# Patient Record
Sex: FEMALE | Race: WHITE | Employment: FULL TIME | URBAN - NONMETROPOLITAN AREA
[De-identification: names, ages, dates, MRNs, and addresses within clinical notes are randomized per-mention and may not be internally consistent; named-entity substitution may affect disease eponyms.]

---

## 2018-01-03 ENCOUNTER — OFFICE VISIT (OUTPATIENT)
Dept: FAMILY MEDICINE CLINIC | Age: 28
End: 2018-01-03

## 2018-01-03 VITALS
TEMPERATURE: 97.8 F | WEIGHT: 178.4 LBS | HEIGHT: 58 IN | DIASTOLIC BLOOD PRESSURE: 82 MMHG | OXYGEN SATURATION: 97 % | SYSTOLIC BLOOD PRESSURE: 130 MMHG | HEART RATE: 99 BPM | BODY MASS INDEX: 37.45 KG/M2

## 2018-01-03 DIAGNOSIS — Z13.31 POSITIVE DEPRESSION SCREENING: ICD-10-CM

## 2018-01-03 DIAGNOSIS — G47.00 INSOMNIA, UNSPECIFIED TYPE: Primary | ICD-10-CM

## 2018-01-03 DIAGNOSIS — J01.90 ACUTE SINUSITIS, RECURRENCE NOT SPECIFIED, UNSPECIFIED LOCATION: ICD-10-CM

## 2018-01-03 DIAGNOSIS — F41.9 ANXIETY: ICD-10-CM

## 2018-01-03 DIAGNOSIS — R40.0 DROWSINESS: ICD-10-CM

## 2018-01-03 DIAGNOSIS — F33.1 MODERATE EPISODE OF RECURRENT MAJOR DEPRESSIVE DISORDER (HCC): ICD-10-CM

## 2018-01-03 DIAGNOSIS — R06.83 SNORING: ICD-10-CM

## 2018-01-03 DIAGNOSIS — M54.50 MIDLINE LOW BACK PAIN WITHOUT SCIATICA, UNSPECIFIED CHRONICITY: ICD-10-CM

## 2018-01-03 PROCEDURE — G8427 DOCREV CUR MEDS BY ELIG CLIN: HCPCS | Performed by: NURSE PRACTITIONER

## 2018-01-03 PROCEDURE — 1036F TOBACCO NON-USER: CPT | Performed by: NURSE PRACTITIONER

## 2018-01-03 PROCEDURE — G8482 FLU IMMUNIZE ORDER/ADMIN: HCPCS | Performed by: NURSE PRACTITIONER

## 2018-01-03 PROCEDURE — G8417 CALC BMI ABV UP PARAM F/U: HCPCS | Performed by: NURSE PRACTITIONER

## 2018-01-03 PROCEDURE — 96160 PT-FOCUSED HLTH RISK ASSMT: CPT | Performed by: NURSE PRACTITIONER

## 2018-01-03 PROCEDURE — 99214 OFFICE O/P EST MOD 30 MIN: CPT | Performed by: NURSE PRACTITIONER

## 2018-01-03 PROCEDURE — G8431 POS CLIN DEPRES SCRN F/U DOC: HCPCS | Performed by: NURSE PRACTITIONER

## 2018-01-03 RX ORDER — CITALOPRAM 20 MG/1
20 TABLET ORAL DAILY
Qty: 30 TABLET | Refills: 5 | Status: CANCELLED | OUTPATIENT
Start: 2018-01-03

## 2018-01-03 RX ORDER — TRAZODONE HYDROCHLORIDE 100 MG/1
100 TABLET ORAL NIGHTLY
Qty: 30 TABLET | Refills: 3 | Status: SHIPPED | OUTPATIENT
Start: 2018-01-03 | End: 2018-04-23 | Stop reason: SDUPTHER

## 2018-01-03 RX ORDER — AMOXICILLIN AND CLAVULANATE POTASSIUM 875; 125 MG/1; MG/1
1 TABLET, FILM COATED ORAL 2 TIMES DAILY
Qty: 20 TABLET | Refills: 0 | Status: SHIPPED | OUTPATIENT
Start: 2018-01-03 | End: 2018-01-13

## 2018-01-03 RX ORDER — CYCLOBENZAPRINE HCL 10 MG
10 TABLET ORAL 3 TIMES DAILY PRN
Qty: 90 TABLET | Refills: 3 | Status: SHIPPED | OUTPATIENT
Start: 2018-01-03 | End: 2020-04-23 | Stop reason: SDUPTHER

## 2018-01-03 RX ORDER — ESCITALOPRAM OXALATE 10 MG/1
10 TABLET ORAL DAILY
Qty: 30 TABLET | Refills: 3 | Status: SHIPPED | OUTPATIENT
Start: 2018-01-03 | End: 2018-04-23 | Stop reason: SDUPTHER

## 2018-01-03 RX ORDER — MELOXICAM 15 MG/1
15 TABLET ORAL DAILY
Qty: 30 TABLET | Refills: 3 | Status: SHIPPED | OUTPATIENT
Start: 2018-01-03 | End: 2018-04-23 | Stop reason: SDUPTHER

## 2018-01-03 RX ORDER — DESOGESTREL AND ETHINYL ESTRADIOL 0.15-0.03
KIT ORAL
Refills: 12 | COMMUNITY
Start: 2017-12-22 | End: 2018-01-23 | Stop reason: SDUPTHER

## 2018-01-03 ASSESSMENT — ENCOUNTER SYMPTOMS
BACK PAIN: 1
SINUS PRESSURE: 1

## 2018-01-03 ASSESSMENT — PATIENT HEALTH QUESTIONNAIRE - PHQ9
SUM OF ALL RESPONSES TO PHQ9 QUESTIONS 1 & 2: 6
3. TROUBLE FALLING OR STAYING ASLEEP: 3
7. TROUBLE CONCENTRATING ON THINGS, SUCH AS READING THE NEWSPAPER OR WATCHING TELEVISION: 3
2. FEELING DOWN, DEPRESSED OR HOPELESS: 3
SUM OF ALL RESPONSES TO PHQ QUESTIONS 1-9: 25
9. THOUGHTS THAT YOU WOULD BE BETTER OFF DEAD, OR OF HURTING YOURSELF: 1
4. FEELING TIRED OR HAVING LITTLE ENERGY: 3
10. IF YOU CHECKED OFF ANY PROBLEMS, HOW DIFFICULT HAVE THESE PROBLEMS MADE IT FOR YOU TO DO YOUR WORK, TAKE CARE OF THINGS AT HOME, OR GET ALONG WITH OTHER PEOPLE: 2
8. MOVING OR SPEAKING SO SLOWLY THAT OTHER PEOPLE COULD HAVE NOTICED. OR THE OPPOSITE, BEING SO FIGETY OR RESTLESS THAT YOU HAVE BEEN MOVING AROUND A LOT MORE THAN USUAL: 3
6. FEELING BAD ABOUT YOURSELF - OR THAT YOU ARE A FAILURE OR HAVE LET YOURSELF OR YOUR FAMILY DOWN: 3
1. LITTLE INTEREST OR PLEASURE IN DOING THINGS: 3
5. POOR APPETITE OR OVEREATING: 3

## 2018-01-03 NOTE — PROGRESS NOTES
Subjective  Chief Complaint   Patient presents with    Anxiety     LOV Oct 2016, would like to talk about medications    Depression     screening done today, SCORED A 25. discuss meds    Back Pain     LOV Oct 2016    Medication Refill    Otalgia     pt states for the last 2 weeks dealing with ear pain. questioning infection? HPI     Pt is here for multiple issues    1) She does not feel that her celexa is working for her depression and anxiety any longer. 2) still struggling with back pain. mobic and flexeril do help. She has not gone to PT yet for it. 3) struggling with sinus pressure and some ear pain. Was dx with sinus infection and ear infection a few weeks ago. She did not take all her antibiotics and feels that it did not go away. 4) issues with sleep. States that she snores a lot and wakes up often. Has trouble falling asleep as well. Long family hx of sleep apnea. There are no active problems to display for this patient.     Past Medical History:   Diagnosis Date    Anxiety     Depression     Osteoarthritis      Past Surgical History:   Procedure Laterality Date    ARM SURGERY Left     fx at the age 4   [facility]  SECTION       Family History   Problem Relation Age of Onset    Depression Mother     High Cholesterol Maternal Grandfather     Heart Disease Maternal Grandfather     Cancer Maternal Grandfather      lung brain    Diabetes Maternal Grandfather     Diabetes Other      mothers dide    Depression Maternal Grandmother     Kidney Disease Paternal Grandfather      Social History     Social History    Marital status:      Spouse name: N/A    Number of children: N/A    Years of education: N/A     Social History Main Topics    Smoking status: Never Smoker    Smokeless tobacco: Never Used    Alcohol use No    Drug use: Unknown    Sexual activity: Yes     Partners: Male     Other Topics Concern    Not on file     Social History Narrative    No narrative on file     Current Outpatient Prescriptions on File Prior to Visit   Medication Sig Dispense Refill    ibuprofen (ADVIL;MOTRIN) 600 MG tablet Take 600 mg by mouth every 6 hours as needed for Pain       No current facility-administered medications on file prior to visit. Allergies   Allergen Reactions    Lidocaine Anaphylaxis    Bee Venom Swelling       Review of Systems   Constitutional: Positive for fatigue. HENT: Positive for congestion, ear pain and sinus pressure. Musculoskeletal: Positive for back pain. Neurological: Positive for headaches. Psychiatric/Behavioral: Positive for dysphoric mood and sleep disturbance. The patient is nervous/anxious. Objective  Vitals:    01/03/18 1107   BP: 130/82   Site: Left Arm   Position: Standing   Cuff Size: Medium Adult   Pulse: 99   Temp: 97.8 °F (36.6 °C)   TempSrc: Tympanic   SpO2: 97%   Weight: 178 lb 6.4 oz (80.9 kg)   Height: 4' 9.5\" (1.461 m)     Physical Exam   Constitutional: She is oriented to person, place, and time. She appears well-developed and well-nourished. HENT:   Right Ear: A middle ear effusion is present. Left Ear: A middle ear effusion is present. Nose: Right sinus exhibits maxillary sinus tenderness and frontal sinus tenderness. Left sinus exhibits maxillary sinus tenderness and frontal sinus tenderness. Eyes: Conjunctivae are normal. Pupils are equal, round, and reactive to light. Neck: Neck supple. No thyromegaly present. Cardiovascular: Normal rate, regular rhythm, normal heart sounds and intact distal pulses. Pulmonary/Chest: Effort normal and breath sounds normal.   Lymphadenopathy:     She has no cervical adenopathy. Neurological: She is alert and oriented to person, place, and time. Psychiatric:   Flat affect     Assessment & Plan    1. Insomnia, unspecified type  traZODone (DESYREL) 100 MG tablet   2.  Midline low back pain without sciatica, unspecified chronicity  meloxicam (MOBIC) 15 MG tablet    cyclobenzaprine (FLEXERIL) 10 MG tablet    [facility] [place]   3. Anxiety     4. Moderate episode of recurrent major depressive disorder (HCC)  escitalopram (LEXAPRO) 10 MG tablet   5. Snoring  Sleep Study with PAP Titration   6. Drowsiness  Sleep Study with PAP Titration   7. Acute sinusitis, recurrence not specified, unspecified location  amoxicillin-clavulanate (AUGMENTIN) 875-125 MG per tablet   8.  Positive depression screening  Positive Screen for Clinical Depression with a Documented Follow-up Plan        Orders Placed This Encounter   Procedures   [address]     Referral Priority:   Routine     Referral Type:   Eval and Treat     Referral Reason:   Specialty Services Required     Requested Specialty:   Physical Therapy     Number of Visits Requested:   1    Sleep Study with PAP Titration     Standing Status:   Future     Standing Expiration Date:   1/3/2019     Order Specific Question:   Sleep Study Titration Type     Answer:   Split Night Study (Baseline followed by PAP Titration)     Order Specific Question:   Location For Sleep Study     Answer:   [place]     Order Specific Question:   Select Sleep Lab Location     Answer:   [facility]    Positive Screen for Clinical Depression with a Documented Follow-up Plan        Orders Placed This Encounter   Medications    meloxicam (MOBIC) 15 MG tablet     Sig: Take 1 tablet by mouth daily     Dispense:  30 tablet     Refill:  3    cyclobenzaprine (FLEXERIL) 10 MG tablet     Sig: Take 1 tablet by mouth 3 times daily as needed for Muscle spasms     Dispense:  90 tablet     Refill:  3    escitalopram (LEXAPRO) 10 MG tablet     Sig: Take 1 tablet by mouth daily     Dispense:  30 tablet     Refill:  3    traZODone (DESYREL) 100 MG tablet     Sig: Take 1 tablet by mouth nightly     Dispense:  30 tablet     Refill:  3    amoxicillin-clavulanate (AUGMENTIN) 875-125 MG per tablet     Sig: Take

## 2018-01-11 ENCOUNTER — HOSPITAL ENCOUNTER (OUTPATIENT)
Dept: PHYSICAL THERAPY | Age: 28
Setting detail: THERAPIES SERIES
Discharge: HOME | End: 2018-01-11
Payer: COMMERCIAL

## 2018-01-11 PROCEDURE — 97162 PT EVAL MOD COMPLEX 30 MIN: CPT

## 2018-01-11 PROCEDURE — 97110 THERAPEUTIC EXERCISES: CPT

## 2018-01-11 ASSESSMENT — PAIN DESCRIPTION - FREQUENCY: FREQUENCY: CONTINUOUS

## 2018-01-11 ASSESSMENT — PAIN DESCRIPTION - DESCRIPTORS: DESCRIPTORS: ACHING

## 2018-01-11 ASSESSMENT — PAIN DESCRIPTION - LOCATION: LOCATION: BACK

## 2018-01-11 ASSESSMENT — PAIN DESCRIPTION - PAIN TYPE: TYPE: CHRONIC PAIN

## 2018-01-11 ASSESSMENT — PAIN SCALES - GENERAL: PAINLEVEL_OUTOF10: 2

## 2018-01-11 ASSESSMENT — PAIN DESCRIPTION - ORIENTATION: ORIENTATION: LOWER

## 2018-01-11 NOTE — PROGRESS NOTES
posture, dec B HS length; Pt reports dec function w/ bending, lifting/ carrying for housheold and job duties, poor kamar to prolonged standing needed for job. Specific instructions for Next Treatment: Cont w/ core stengthening, add stretches  Prognosis: Good      New Education Provided: HEP - see exs  G-Codes       PLAN: [x] Evaluate and Treat  Frequency/Duration:  Plan  Times per week: 2  Plan weeks: 5  Specific instructions for Next Treatment: Cont w/ core stengthening, add stretches  Current Treatment Recommendations: Strengthening, ROM, Neuromuscular Re-education, Manual Therapy - Soft Tissue Mobilization, Manual Therapy - Joint Manipulation, Modalities, Home Exercise Program, Integrated Dry Needling  Plan Comment: Skilled PT     Precautions:             ?     Patient Status:[x] Continue/ Initiate plan of Care    [] Discharge PT. Recommend pt continue with HEP. [] Additional visits requested, Please re-certify for additional visits:          Signature: Electronically signed by Elisha Arroyo PT on 1/11/18 at 10:09 AM      If you have any questions or concerns, please don't hesitate to call. Thank you for your referral.    I have reviewed this plan of care and certify a need for medically necessary rehabilitation services.     Physician Signature:__________________________________________________________  Date:  Please sign and return
strength, ROM, YANIV 18/50  Clinical Presentation: Mod- evolving        Plan  Frequency/Duration:  Plan  Times per week: 2  Plan weeks: 5  Specific instructions for Next Treatment: Cont w/ core stengthening, add stretches  Current Treatment Recommendations: Strengthening, ROM, Neuromuscular Re-education, Manual Therapy - Soft Tissue Mobilization, Manual Therapy - Joint Manipulation, Modalities, Home Exercise Program, Integrated Dry Needling  Plan Comment: Skilled PT       G-Codes       Patient Education  New Education Provided: HEP - see exs    POST-PAIN     Pain Rating (0-10 pain scale):1-2   /10  Location and pain description same as pre-treatment unless indicated. Action: [] NA  [] Call Physician  [x] Perform HEP  [] Meds as prescribed    Evaluation and patient rights have been reviewed and patient agrees with plan of care. Yes  [x]  No  []   Explain:       Sheldon Fall Risk Assessment  Risk Factor Scale  Score   History of Falls [] Yes  [] No 25  0 0   Secondary Diagnosis [] Yes  [] No 15  0 0   Ambulatory Aid [] Furniture  [] Crutches/cane/walker  [] None/bedrest/wheelchair/nurse 30  15  0 0   IV/Heparin Lock [] Yes  [] No 20  0 0   Gait/Transferring [] Impaired  [] Weak  [] Normal/bedrest/immobile 20  10  0 0   Mental Status [] Forgets limitations  [] Oriented to own ability 15  0 0      Total:0     Based on the Assessment score: check the appropriate box. [x]  No intervention needed   Low =   Score of 0-24  []  Use standard prevention interventions Moderate =  Score of 24-44   [] Discuss fall prevention strategies   [] Indicate moderate falls risk on eval  []  Use high risk prevention interventions High = Score of 45 and higher   [] Discuss fall prevention strategies   [] Provide supervision during treatment time    Goals  Long term goals  Time Frame for Long term goals : 10 visits   Long term goal 1:  Dec LBP and LE sxs to 0/10 to 4/10 at worse   Long term goal 2:  Inc strength of core to demo stability w/

## 2018-01-12 ENCOUNTER — HOSPITAL ENCOUNTER (OUTPATIENT)
Dept: PHYSICAL THERAPY | Age: 28
Setting detail: THERAPIES SERIES
Discharge: HOME | End: 2018-01-12
Payer: COMMERCIAL

## 2018-01-12 PROCEDURE — 97140 MANUAL THERAPY 1/> REGIONS: CPT

## 2018-01-12 PROCEDURE — 97110 THERAPEUTIC EXERCISES: CPT

## 2018-01-12 ASSESSMENT — PAIN DESCRIPTION - ORIENTATION: ORIENTATION: LOWER

## 2018-01-12 ASSESSMENT — PAIN SCALES - GENERAL: PAINLEVEL_OUTOF10: 5

## 2018-01-12 ASSESSMENT — PAIN DESCRIPTION - DESCRIPTORS: DESCRIPTORS: ACHING;SHARP

## 2018-01-12 ASSESSMENT — PAIN DESCRIPTION - LOCATION: LOCATION: BACK

## 2018-01-12 ASSESSMENT — PAIN DESCRIPTION - FREQUENCY: FREQUENCY: CONTINUOUS

## 2018-01-12 NOTE — PROGRESS NOTES
[address] [address]  Outpatient Physical Therapy    Treatment Note        Date: 2018  Patient: Anat Celis  : 1990  ACCT #: [de-identified]  Referring Practitioner: Robbi Arambula NP   Diagnosis: Midline LBP    Visit Information:  PT Visit Information  Onset Date: 18  Total # of Visits Approved: 30  Total # of Visits to Date: 2  No Show: 0  Canceled Appointment: 0  Progress Note Counter: 2/10     Subjective: \"My back hurts. \"     HEP Compliance:  [x] Good [] Fair [] Poor [] Reports not doing due to:    Vital Signs  Patient Currently in Pain: Yes   Pain Screening  Patient Currently in Pain: Yes  Pain Assessment  Pain Assessment: 0-10  Pain Level: 5  Pain Location: Back  Pain Orientation: Lower  Pain Descriptors: Aching; Sharp  Pain Frequency: Continuous    OBJECTIVE:   Exercises  Exercise 2: TA isos w/ breath 5\"x10  Exercise 3: TA marches x10  Exercise 4: TA alt ER x10  Exercise 5:  TA articulated iso bridges 10s x 10 ( w/ towel to dec HS cramping)  Exercise 6: B TA SLR x10  Exercise 7: SKTC 3x30\"  Exercise 8:  DKTC 3x30\"  Exercise 9: supine DLS alt UE/LE A31   Exercise 12: B HS stretch , seated w/ stool 3x30\"    Manual:   Manual therapy  Soft Tissue Mobalization: to Lx paraspinals with tennis ball to decrease muscle tension x8 min    Modalities:  Modalities  Moist heat: post tx to LB to decrease muscle tension x10 min     *Indicates exercise, modality, or manual techniques to be initiated when appropriate    Assessment: Body structures, Functions, Activity limitations: Decreased ADL status, Decreased high-level IADLs, Decreased ROM  Assessment: VC for hip flexion vs lumbar flexion to inc HS stretch. Good tolerance to all therex this date, though with VC for TA activation. Treatment Diagnosis: LBP: Impaired Strength  Prognosis: Good     Goals:  Long term goals  Time Frame for Long term goals : 10 visits   Long term goal 1:  Dec LBP and LE sxs to 0/10 to 4/10 at worse   Long term goal 2:  Inc

## 2018-01-15 ENCOUNTER — HOSPITAL ENCOUNTER (OUTPATIENT)
Dept: PHYSICAL THERAPY | Age: 28
Setting detail: THERAPIES SERIES
Discharge: HOME | End: 2018-01-15
Payer: COMMERCIAL

## 2018-01-15 PROCEDURE — 97110 THERAPEUTIC EXERCISES: CPT

## 2018-01-15 ASSESSMENT — PAIN SCALES - GENERAL: PAINLEVEL_OUTOF10: 4

## 2018-01-15 ASSESSMENT — PAIN DESCRIPTION - LOCATION: LOCATION: BACK

## 2018-01-15 ASSESSMENT — PAIN DESCRIPTION - ORIENTATION: ORIENTATION: LOWER

## 2018-01-15 ASSESSMENT — PAIN DESCRIPTION - DESCRIPTORS: DESCRIPTORS: ACHING;TIGHTNESS;SORE

## 2018-01-15 ASSESSMENT — PAIN DESCRIPTION - PAIN TYPE: TYPE: CHRONIC PAIN

## 2018-01-17 ENCOUNTER — HOSPITAL ENCOUNTER (OUTPATIENT)
Dept: PHYSICAL THERAPY | Age: 28
Setting detail: THERAPIES SERIES
Discharge: HOME | End: 2018-01-17
Payer: COMMERCIAL

## 2018-01-17 PROCEDURE — 97110 THERAPEUTIC EXERCISES: CPT

## 2018-01-17 NOTE — PROGRESS NOTES
[address] [address]  Outpatient Physical Therapy    Treatment Note        Date: 2018  Patient: Rosana Chacon  : 1990  ACCT #: [de-identified]  Referring Practitioner: Leila Cárdenas NP   Diagnosis: Midline LBP    Visit Information:  PT Visit Information  Onset Date: 18  Total # of Visits Approved: 30  Total # of Visits to Date: 4  No Show: 0  Canceled Appointment: 0  Progress Note Counter: 4/10    Subjective: Pt reports that pain comes and goes throughout the day  Comments: Pt arrived 8 min late for tx  HEP Compliance:  [x] Good [] Fair [] Poor [] Reports not doing due to:    Vital Signs  Patient Currently in Pain: No   Pain Screening  Patient Currently in Pain: No    OBJECTIVE:   Exercises  Exercise 1: piriformis stretch 3 x 20 sec in supine, b/l  Exercise 2: TA isos w/ breath 5\"x10  Exercise 4: TA alt ER x10  Exercise 5: bridges 5s x 15  Exercise 6: B TA SLR x15  Exercise 9: supine DLS x 15,, 3-way  Exercise 10: child's pose 3 x 30 sec  Exercise 11: ab walkouts x 15  Exercise 12:  HS stretch longsitting 3x30\" b/l  Exercise 13: DLS double arm reach x 15 with 2# ball  Exercise 16: rows/lats RTB x10  Exercise 20: HEP: supine DLS         Strength: [x] NT  [] MMT completed:     ROM: [x] NT  [] ROM measurements:         *Indicates exercise, modality, or manual techniques to be initiated when appropriate    Assessment:   Activity Tolerance  Activity Tolerance: Patient Tolerated treatment well    Body structures, Functions, Activity limitations: Decreased ADL status, Decreased high-level IADLs, Decreased ROM  Assessment: HS stretches performed longsitting with improved technique and no increase in pain. Additional core stability and posture strengthening ex added to improve function.   Treatment Diagnosis: LBP: Impaired Strength  Prognosis: Good       Goals:       Long term goals  Time Frame for Long term goals : 10 visits   Long term goal 1:  Dec LBP and LE sxs to 0/10 to 4/10 at worse   Long term

## 2018-01-22 ENCOUNTER — HOSPITAL ENCOUNTER (OUTPATIENT)
Dept: PHYSICAL THERAPY | Age: 28
Setting detail: THERAPIES SERIES
Discharge: HOME | End: 2018-01-22
Payer: COMMERCIAL

## 2018-01-22 PROCEDURE — 97110 THERAPEUTIC EXERCISES: CPT

## 2018-01-22 ASSESSMENT — PAIN DESCRIPTION - FREQUENCY: FREQUENCY: CONTINUOUS

## 2018-01-22 ASSESSMENT — PAIN SCALES - GENERAL: PAINLEVEL_OUTOF10: 3

## 2018-01-22 ASSESSMENT — PAIN DESCRIPTION - PAIN TYPE: TYPE: CHRONIC PAIN

## 2018-01-22 ASSESSMENT — PAIN DESCRIPTION - DESCRIPTORS: DESCRIPTORS: ACHING;SORE;TIGHTNESS

## 2018-01-22 NOTE — PROGRESS NOTES
Good      Goals:   Long term goals  Time Frame for Long term goals : 10 visits   Long term goal 1:  Dec LBP and LE sxs to 0/10 to 4/10 at worse   Long term goal 2: Inc strength of core to demo stability w/ lev 2 TA march ( from 90-90) to dec pain w/ lfiting/ carryin  Long term goal 3: Inc B HS length to demo PROM SLR >/= 55 to improve bending  Long term goal 4: Pt to demo improved seated psoture w/  minimal cuing  Long term goal 5: Pt to demo WFL- WNL Lx AROM pain-free  Long term goal 6: dec YANIV to </= 8/10  Progress toward goals: Cont to progress core/postural exs as able. POST-PAIN       Pain Rating (0-10 pain scale):  0 /10   Location and pain description same as pre-treatment unless indicated. Action: [] NA   [] Perform HEP  [] Meds as prescribed  [] Modalities as prescribed   [] Call Physician     Frequency/Duration:  Plan  Times per week: 2  Plan weeks: 5  Current Treatment Recommendations: Strengthening, ROM, Neuromuscular Re-education, Manual Therapy - Soft Tissue Mobilization, Manual Therapy - Joint Manipulation, Modalities, Home Exercise Program, Integrated Dry Needling     Pt to continue current HEP. See objective section for any therapeutic exercise changes, additions or modifications this date.     PT Individual Minutes  Time In: 5571  Time Out: 0405  Minutes: 38  Timed Code Treatment Minutes: 38 Minutes  Procedure Minutes: N/A  Signature:  Electronically signed by Kvng Steve PTA on 1/22/18 at 9:17 AM

## 2018-01-23 ENCOUNTER — OFFICE VISIT (OUTPATIENT)
Dept: OBGYN CLINIC | Age: 28
End: 2018-01-23
Payer: COMMERCIAL

## 2018-01-23 VITALS
SYSTOLIC BLOOD PRESSURE: 110 MMHG | WEIGHT: 179.4 LBS | BODY MASS INDEX: 37.66 KG/M2 | HEIGHT: 58 IN | DIASTOLIC BLOOD PRESSURE: 62 MMHG

## 2018-01-23 DIAGNOSIS — Z11.51 SCREENING FOR HPV (HUMAN PAPILLOMAVIRUS): ICD-10-CM

## 2018-01-23 DIAGNOSIS — Z11.3 ROUTINE SCREENING FOR STI (SEXUALLY TRANSMITTED INFECTION): ICD-10-CM

## 2018-01-23 DIAGNOSIS — Z01.419 WOMEN'S ANNUAL ROUTINE GYNECOLOGICAL EXAMINATION: ICD-10-CM

## 2018-01-23 DIAGNOSIS — Z01.419 WOMEN'S ANNUAL ROUTINE GYNECOLOGICAL EXAMINATION: Primary | ICD-10-CM

## 2018-01-23 LAB
HAV IGM SER IA-ACNC: NORMAL
HEPATITIS B CORE IGM ANTIBODY: NORMAL
HEPATITIS B SURFACE ANTIGEN INTERPRETATION: NORMAL
HEPATITIS C ANTIBODY INTERPRETATION: NORMAL
HEPATITIS INTERPRETATION:: NORMAL

## 2018-01-23 PROCEDURE — 99385 PREV VISIT NEW AGE 18-39: CPT | Performed by: OBSTETRICS & GYNECOLOGY

## 2018-01-23 ASSESSMENT — PATIENT HEALTH QUESTIONNAIRE - PHQ9
SUM OF ALL RESPONSES TO PHQ QUESTIONS 1-9: 0
SUM OF ALL RESPONSES TO PHQ9 QUESTIONS 1 & 2: 0
2. FEELING DOWN, DEPRESSED OR HOPELESS: 0
1. LITTLE INTEREST OR PLEASURE IN DOING THINGS: 0

## 2018-01-24 ENCOUNTER — HOSPITAL ENCOUNTER (OUTPATIENT)
Dept: PHYSICAL THERAPY | Age: 28
Setting detail: THERAPIES SERIES
Discharge: HOME | End: 2018-01-24
Payer: COMMERCIAL

## 2018-01-24 LAB
CLUE CELLS: NORMAL
RPR: NORMAL
TRICHOMONAS PREP: NORMAL
TRICHOMONAS VAGINALIS SCREEN: NEGATIVE
YEAST WET PREP: NORMAL

## 2018-01-24 PROCEDURE — 97110 THERAPEUTIC EXERCISES: CPT

## 2018-01-24 ASSESSMENT — PAIN DESCRIPTION - LOCATION: LOCATION: BACK

## 2018-01-24 ASSESSMENT — PAIN SCALES - GENERAL: PAINLEVEL_OUTOF10: 2

## 2018-01-24 ASSESSMENT — PAIN DESCRIPTION - PAIN TYPE: TYPE: CHRONIC PAIN

## 2018-01-24 ASSESSMENT — PAIN DESCRIPTION - ORIENTATION: ORIENTATION: LOWER

## 2018-01-24 ASSESSMENT — PAIN DESCRIPTION - DESCRIPTORS: DESCRIPTORS: ACHING;DULL;SHARP;TIGHTNESS

## 2018-01-25 LAB
HIV-1 AND HIV-2 ANTIBODIES: NEGATIVE
HSV 1 GLYCOPROTEIN G AB IGG: 18 IV
HSV 2 GLYCOPROTEIN G AB IGG: 4.51 IV

## 2018-01-29 LAB
C TRACH DNA GENITAL QL NAA+PROBE: NEGATIVE
N. GONORRHOEAE DNA: NEGATIVE

## 2018-01-29 RX ORDER — DESOGESTREL AND ETHINYL ESTRADIOL 0.15-0.03
KIT ORAL
Qty: 1 PACKET | Refills: 12 | Status: SHIPPED | OUTPATIENT
Start: 2018-01-29 | End: 2018-03-26 | Stop reason: SDUPTHER

## 2018-01-29 NOTE — PROGRESS NOTES
History and Physical  [facility] [place]   [address]  P: [phone] / F: [phone]  Gaby Poster  2018              33 y.o. Chief Complaint   Patient presents with    Annual Exam     last pap x5yrs.  Student     ok     New Patient       /62   Ht 4' 9.5\" (1.461 m)   Wt 179 lb 6.4 oz (81.4 kg)   LMP 2017   Breastfeeding? No   BMI 38.15 kg/m²   Alllergies:  Lidocaine and Bee venom               Primary Care Physician: Ronald Elizondo NP    HPI : Gaby Poster is a 33 y.o. female  The patient was seen and examined. She has no chief complaint today and is here for her annual exam.  Her bowels are regular. There are no voiding complaints. She denies any bloating. She denies vaginal discharge and was counseled on STD's and the need for barrier contraception.        ________________________________________________________________________  Obstetric History       T0      L1     SAB0   TAB0   Ectopic0   Molar0   Multiple0   Live Births1       # Outcome Date GA Lbr Daniel/2nd Weight Sex Delivery Anes PTL Lv   1  09   7 lb 13 oz (3.544 kg) F CS-Unspec EPI N RAO      Name: Kya Randall         Past Medical History:   Diagnosis Date    Anxiety     Depression     Osteoarthritis                                                                    Past Surgical History:   Procedure Laterality Date    ARM SURGERY Left     fx at the age 4   [place]  SECTION  2009     Family History   Problem Relation Age of Onset    Depression Mother     High Cholesterol Maternal Grandfather     Heart Disease Maternal Grandfather     Cancer Maternal Grandfather      lung brain    Diabetes Maternal Grandfather     Diabetes Other      mothers dide    Depression Maternal Grandmother     Kidney Disease Paternal Grandfather      Social History     Social History    Marital status:      Spouse name: ROS: No prior breast abnormalities or lumps  Respiratory ROS: No SOB, Pneumoniae,Cough, or Pulmonary Embolism History  Cardiovascular ROS: No Chest Pain with Exertion, Palpitations, Syncope, Edema, Arrhythmia  Gastrointestinal ROS: No Indigestion, Heartburn, Nausea, Vomiting, Diarrhea, Constipation,or Bowel Changes; No Bloody Stools or melena  Genito-Urinary ROS: No Dysuria, Hematuria or Nocturia. No Urinary Incontinence or Vaginal Discharge  Musculoskeletal ROS: No Arthralgia, Gout,Osteoporosis or Rheumatism  Neurological ROS: No CVA, Migraines, Epilepsy, Seizure Hx, or Limb Weakness  Dermatological ROS: No Rash, Itching, Hives, Mole Changes or Cancer                                                                                                                                                 PHYSICAL Exam:    Constitutional:  Vitals:    01/23/18 1241   BP: 110/62   Weight: 179 lb 6.4 oz (81.4 kg)   Height: 4' 9.5\" (1.461 m)       General Appearance: This  is a well Developed, well Nourished, well groomed female. Her BMI was reviewed. Nutritional decision making was discussed. Skin:  There was a Normal Inspection of the skin without rashes or lesions. There were no rashes. (Papular, Maculopapular, Hives, Pustular, Macular)     There were no lesions (Ulcers, Erythema, Abn. Appearing Nevi)      Lymphatic:  No Lymph Nodes were Palpable in the neck, axilla or groin. Neck and EENT:  The neck was supple. There were no masses   The thyroid was not enlarged and had no masses. Perrla, EOMI B/L, TMI B/L No Abnormalities. Throat inspected-No exudates or Masses, Nares Patent No Masses    Respiratory: The lungs were auscultated and found to be clear. There were no rales, rhonchi or wheezes. There was a good respiratory effort. Cardiovascular: The heart was in a regular rate and rhythm. No S3 or S4. There was no murmur appreciated. Location, grade, and radiation are not applicable. Extremities:   The patients extremities were without calf tenderness, edema, or varicosities. There was full range of motion in all four extremities. Pulses in all four extremities were appreciated and are 2/4. Abdomen: The abdomen was soft and non-tender. There were good bowel sounds in all quadrants and there was no guarding, rebound or rigidity. On evaluation there was no evidence of hepatosplenomegaly and there was no costal vertebral joshua tenderness bilaterally. No hernias were appreciated. Abdominal Scars:     Psych: The patient had a normal Orientation to: Time, Place, Person, and Situation  There is no Mood / Affect changes    Breast:  (Chest)  normal appearance, no masses or tenderness  Self breast exams were reviewed in detail. Literature was given. Pelvic Exam:  Vulva and vagina appear normal. Bimanual exam reveals normal uterus and adnexa. Rectal Exam:  Normal    Musculosk:  Normal Gait and station was noted. Digits were evaluated without abnormal findings. Range of motion, stability and strength were evaluated and found to be appropriate for the patients age. ASSESSMENT:      33 y.o. Annual  1. Women's annual routine gynecological examination  APRI 0.15-30 MG-MCG per tablet    PAP SMEAR    CANCELED: PAP SMEAR   2. Screening for HPV (human papillomavirus)  APRI 0.15-30 MG-MCG per tablet    PAP SMEAR    CANCELED: PAP SMEAR   3. Routine screening for STI (sexually transmitted infection)  Genital Culture    C.trachomatis N.gonorrhoeae DNA    Wet Prep, Genital    HIV-1,-2 w/Reflex to HIV-1 Western Blot    HSV 1, 2 Glyco G-Specific IgG    RPR Reflex to Titer and TPPA    Hepatitis Panel, Acute    CANCELED: PAP SMEAR                  Hereditary Breast, Ovarian, Colon and Uterine Cancer screening Done. Tobacco & Secondary smoke risks reviewed; instructed on cessation and avoidance      Counseling Completed:          PLAN:  No Follow-up on file.   Repeat Annual every 1 year  Cervical Cytology

## 2018-01-30 LAB
HPV COMMENT: ABNORMAL
HPV TYPE 16: NOT DETECTED
HPV TYPE 18: NOT DETECTED
HPVOH (OTHER TYPES): DETECTED

## 2018-02-15 ENCOUNTER — HOSPITAL ENCOUNTER (OUTPATIENT)
Dept: PHYSICAL THERAPY | Age: 28
Setting detail: THERAPIES SERIES
Discharge: HOME | End: 2018-02-15
Payer: COMMERCIAL

## 2018-02-15 PROCEDURE — 97110 THERAPEUTIC EXERCISES: CPT

## 2018-02-15 ASSESSMENT — PAIN DESCRIPTION - LOCATION: LOCATION: BACK

## 2018-02-15 ASSESSMENT — PAIN DESCRIPTION - ORIENTATION: ORIENTATION: LOWER

## 2018-02-15 ASSESSMENT — PAIN SCALES - GENERAL: PAINLEVEL_OUTOF10: 2

## 2018-02-15 ASSESSMENT — PAIN DESCRIPTION - FREQUENCY: FREQUENCY: CONTINUOUS

## 2018-02-15 ASSESSMENT — PAIN DESCRIPTION - PAIN TYPE: TYPE: CHRONIC PAIN

## 2018-02-16 ENCOUNTER — HOSPITAL ENCOUNTER (OUTPATIENT)
Dept: PHYSICAL THERAPY | Age: 28
Setting detail: THERAPIES SERIES
Discharge: HOME | End: 2018-02-16
Payer: COMMERCIAL

## 2018-02-16 PROCEDURE — 97110 THERAPEUTIC EXERCISES: CPT

## 2018-02-16 NOTE — PROGRESS NOTES
Treatment Diagnosis: LBP: Impaired Strength  Prognosis: Good  Goals:  Long term goals  Time Frame for Long term goals : 10 visits   Long term goal 1:  Dec LBP and LE sxs to 0/10 to 4/10 at worse   Long term goal 2: Inc strength of core to demo stability w/ lev 2 TA march ( from 90-90) to dec pain w/ lfiting/ carryin  Long term goal 3: Inc B HS length to demo PROM SLR >/= 55 to improve bending  Long term goal 4: Pt to demo improved seated psoture w/  minimal cuing  Long term goal 5: Pt to demo WFL- WNL Lx AROM pain-free  Long term goal 6: dec YANIV to </= 8/10  Progress toward goals: continue towards all     POST-PAIN       Pain Rating (0-10 pain scale):  0/10   Location and pain description same as pre-treatment unless indicated. Action: [] NA   [] Perform HEP  [] Meds as prescribed  [] Modalities as prescribed   [] Call Physician     Frequency/Duration:  Plan  Times per week: 2  Plan weeks: 5  Current Treatment Recommendations: Strengthening, ROM, Neuromuscular Re-education, Manual Therapy - Soft Tissue Mobilization, Manual Therapy - Joint Manipulation, Modalities, Home Exercise Program, Integrated Dry Needling     Pt to continue current HEP. See objective section for any therapeutic exercise changes, additions or modifications this date.          PT Individual Minutes  Time In: 0840  Time Out: 0920  Minutes: 40  Timed Code Treatment Minutes: 40 Minutes  Procedure Minutes:0  Signature:  Electronically signed by Neel Queen PTA on 2/16/18 at 10:25 AM

## 2018-03-20 ENCOUNTER — CLINICAL DOCUMENTATION (OUTPATIENT)
Dept: PHYSICAL THERAPY | Age: 28
End: 2018-03-20

## 2018-03-20 NOTE — PROGRESS NOTES
Glad[address]                                  Ph: [phone]  Fax: [phone]     [] Certification  [] Recertification  []  Plan of Care  [] Progress Note [x] Discharge                            To:  Referring Practitioner: Rajat Segura NP         From:  Rosita Neves, PT  Patient: Alondra Winchester     : 1990  Diagnosis: Midline LBP     Date: 2018  Treatment Diagnosis: LBP: Impaired Strength     Progress Report Period from:   2018 to 3-21-28     Total # of Visits to Date: 8               OBJECTIVE:      Long Term Goals - Time Frame for Long term goals : 10 visits   Goals Current/ Discharge status Met   Long term goal 1:  Dec LBP and LE sxs to 0/10 to 4/10 at worse  0-8/10 as of last visit 18 [] yes  [x] no   Long term goal 2: Inc strength of core to demo stability w/ lev 2 TA march ( from 90-90) to dec pain w/ lfiting/ carryin NT d/t unexpected D/C [] yes  [x] no   Long term goal 3: Inc B HS length to demo PROM SLR >/= 55 to improve bending PROM SLR R 90, L 80 ( 18) [] yes  [x] no   Long term goal 4: Pt to demo improved seated psoture w/  minimal cuing NT d/t unexpected D/C [] yes  [x] no   Long term goal 5: Pt to demo WFL- WNL Lx AROM pain-free NT d/t unexpected D/C [] yes  [x] no    Long term goal 6: dec YANIV to </= 8/10 NT d/t unexpected D/C [] yes  [x] no       [] yes  [] no             Assessment: Pt  Was still requiring cuinf for exs and demonstrated challenges w/ exs and reported dec N&T in LEs; Pt did not return to complete POC: D/C       PLAN: D/C             ? Patient Status:[x] Continue/ Initiate plan of Care                          [] Discharge PT. Recommend pt continue with HEP.                            [] Additional visits requested, Please re-certify for additional visits:

## 2018-03-26 ENCOUNTER — OFFICE VISIT (OUTPATIENT)
Dept: FAMILY MEDICINE CLINIC | Age: 28
End: 2018-03-26
Payer: COMMERCIAL

## 2018-03-26 VITALS
DIASTOLIC BLOOD PRESSURE: 72 MMHG | RESPIRATION RATE: 16 BRPM | HEART RATE: 90 BPM | SYSTOLIC BLOOD PRESSURE: 106 MMHG | OXYGEN SATURATION: 98 % | WEIGHT: 184.8 LBS | BODY MASS INDEX: 39.3 KG/M2

## 2018-03-26 DIAGNOSIS — N94.6 DYSMENORRHEA TREATED WITH ORAL CONTRACEPTIVE: ICD-10-CM

## 2018-03-26 DIAGNOSIS — Z79.3 DYSMENORRHEA TREATED WITH ORAL CONTRACEPTIVE: Primary | ICD-10-CM

## 2018-03-26 DIAGNOSIS — N94.6 DYSMENORRHEA TREATED WITH ORAL CONTRACEPTIVE: Primary | ICD-10-CM

## 2018-03-26 DIAGNOSIS — Z79.3 DYSMENORRHEA TREATED WITH ORAL CONTRACEPTIVE: ICD-10-CM

## 2018-03-26 LAB
BASOPHILS ABSOLUTE: 0 K/UL (ref 0–0.2)
BASOPHILS RELATIVE PERCENT: 0.3 %
EOSINOPHILS ABSOLUTE: 0.1 K/UL (ref 0–0.7)
EOSINOPHILS RELATIVE PERCENT: 1.1 %
FERRITIN: 17.8 NG/ML (ref 13–150)
HCT VFR BLD CALC: 38.3 % (ref 37–47)
HEMOGLOBIN: 12.8 G/DL (ref 12–16)
IRON SATURATION: 8 % (ref 11–46)
IRON: 35 UG/DL (ref 37–145)
LYMPHOCYTES ABSOLUTE: 1.9 K/UL (ref 1–4.8)
LYMPHOCYTES RELATIVE PERCENT: 29.4 %
MCH RBC QN AUTO: 29.5 PG (ref 27–31.3)
MCHC RBC AUTO-ENTMCNC: 33.6 % (ref 33–37)
MCV RBC AUTO: 87.9 FL (ref 82–100)
MONOCYTES ABSOLUTE: 0.3 K/UL (ref 0.2–0.8)
MONOCYTES RELATIVE PERCENT: 5 %
NEUTROPHILS ABSOLUTE: 4.1 K/UL (ref 1.4–6.5)
NEUTROPHILS RELATIVE PERCENT: 64.2 %
PDW BLD-RTO: 13.6 % (ref 11.5–14.5)
PLATELET # BLD: 214 K/UL (ref 130–400)
RBC # BLD: 4.35 M/UL (ref 4.2–5.4)
TOTAL IRON BINDING CAPACITY: 423 UG/DL (ref 178–450)
WBC # BLD: 6.4 K/UL (ref 4.8–10.8)

## 2018-03-26 PROCEDURE — G8482 FLU IMMUNIZE ORDER/ADMIN: HCPCS | Performed by: NURSE PRACTITIONER

## 2018-03-26 PROCEDURE — G8417 CALC BMI ABV UP PARAM F/U: HCPCS | Performed by: NURSE PRACTITIONER

## 2018-03-26 PROCEDURE — G8427 DOCREV CUR MEDS BY ELIG CLIN: HCPCS | Performed by: NURSE PRACTITIONER

## 2018-03-26 PROCEDURE — 99213 OFFICE O/P EST LOW 20 MIN: CPT | Performed by: NURSE PRACTITIONER

## 2018-03-26 PROCEDURE — 1036F TOBACCO NON-USER: CPT | Performed by: NURSE PRACTITIONER

## 2018-03-26 RX ORDER — DESOGESTREL AND ETHINYL ESTRADIOL 0.15-0.03
KIT ORAL
Qty: 2 PACKET | Refills: 0 | Status: SHIPPED | OUTPATIENT
Start: 2018-03-26 | End: 2018-10-08 | Stop reason: SDUPTHER

## 2018-03-26 RX ORDER — ONDANSETRON 4 MG/1
8 TABLET, ORALLY DISINTEGRATING ORAL EVERY 8 HOURS PRN
Qty: 40 TABLET | Refills: 0 | Status: SHIPPED | OUTPATIENT
Start: 2018-03-26 | End: 2018-04-05

## 2018-03-26 NOTE — PATIENT INSTRUCTIONS
Patient Education        Painful Menstrual Cramps: Care Instructions  Your Care Instructions    Painful menstrual cramps are very common. Many women go to the doctor because of bad cramps when they get their period. You may have cramps in your back, thighs, and belly. You may also have diarrhea, constipation, or nausea. Some women also get dizzy. Pain medicine and home treatment can help you feel better. Follow-up care is a key part of your treatment and safety. Be sure to make and go to all appointments, and call your doctor if you are having problems. It's also a good idea to know your test results and keep a list of the medicines you take. How can you care for yourself at home? · Take anti-inflammatory medicines for pain. Ibuprofen (Advil, Motrin) and naproxen (Aleve) usually work better than aspirin. ¨ Be safe with medicines. Talk to your doctor or pharmacist before you take any of these medicines. They may not be safe if you take other medicines or have other health problems. ¨ Start taking the recommended dose of pain medicine as soon as you start to feel pain. Or you can start on the day before your period. Keep taking the medicine for as many days as you have cramps. ¨ If anti-inflammatory medicines don't help, try acetaminophen (Tylenol). ¨ Do not take two or more pain medicines at the same time unless the doctor told you to. Many pain medicines have acetaminophen, which is Tylenol. Too much acetaminophen (Tylenol) can be harmful. ¨ Read and follow all instructions on the label. · Put a heating pad set on low or a hot water bottle on your belly. Or take a warm bath. Heat improves blood flow and may help with pain. · Lie down and put a pillow under your knees. Or lie on your side and bring your knees up to your chest. This will help with any back pressure. · Get at least 30 minutes of exercise on most days of the week. This improves blood flow and may decrease pain. Walking is a good choice.

## 2018-03-28 ASSESSMENT — ENCOUNTER SYMPTOMS
BACK PAIN: 0
WHEEZING: 0
NAUSEA: 0
DIARRHEA: 0
ABDOMINAL PAIN: 1
VOMITING: 0
SORE THROAT: 0
CONSTIPATION: 0
SHORTNESS OF BREATH: 0
COUGH: 0

## 2018-03-28 NOTE — PROGRESS NOTES
Subjective:      Patient ID: Romayne Olp is a 33 y.o. female who present today with:   Chief Complaint   Patient presents with    Dysmenorrhea     X3 wks, pt has had an exremely heavy for the last 3 wks, pt states she skipped the non-active tablets in her birth control packs for the last 4 months     Vaginal Bleeding   The patient's primary symptoms include vaginal bleeding. The patient's pertinent negatives include no genital itching, genital lesions, genital odor, genital rash, missed menses, pelvic pain or vaginal discharge. This is a new problem. The current episode started 1 to 4 weeks ago. The problem occurs constantly. The problem has been unchanged. The pain is moderate. The problem affects both sides. She is not pregnant. Associated symptoms include abdominal pain. Pertinent negatives include no anorexia, back pain, chills, constipation, diarrhea, discolored urine, dysuria, fever, flank pain, frequency, headaches, hematuria, joint pain, joint swelling, nausea, painful intercourse, rash, sore throat, urgency or vomiting. The vaginal discharge was bloody. The vaginal bleeding is heavier than menses. She has been passing clots. She has not been passing tissue. Nothing aggravates the symptoms. She has tried nothing for the symptoms. The treatment provided no relief. She is sexually active. No, her partner does not have an STD. She uses oral contraceptives for contraception. Her menstrual history has been regular. There is no history of an abdominal surgery, a  section, an ectopic pregnancy, endometriosis, a gynecological surgery, herpes simplex, menorrhagia, metrorrhagia, miscarriage, ovarian cysts, perineal abscess, PID, an STD, a terminated pregnancy or vaginosis.      Patient reports that she stopped taking the placebo pills in her OCP pack for the last 4 months  Has had her period for the last 3 weeks  Bleeding is heavy, clotting present    Past Medical History:   Diagnosis Date    Anxiety  Depression     Osteoarthritis      There are no active problems to display for this patient. Past Surgical History:   Procedure Laterality Date    ARM SURGERY Left     fx at the age 4   [facility]  SECTION       Social History     Social History    Marital status:      Spouse name: N/A    Number of children: N/A    Years of education: N/A     Social History Main Topics    Smoking status: Never Smoker    Smokeless tobacco: Never Used    Alcohol use No    Drug use: Unknown    Sexual activity: Yes     Partners: Male     Other Topics Concern    None     Social History Narrative    None     Current Outpatient Prescriptions on File Prior to Visit   Medication Sig Dispense Refill    meloxicam (MOBIC) 15 MG tablet Take 1 tablet by mouth daily 30 tablet 3    cyclobenzaprine (FLEXERIL) 10 MG tablet Take 1 tablet by mouth 3 times daily as needed for Muscle spasms 90 tablet 3    escitalopram (LEXAPRO) 10 MG tablet Take 1 tablet by mouth daily 30 tablet 3    traZODone (DESYREL) 100 MG tablet Take 1 tablet by mouth nightly 30 tablet 3    ibuprofen (ADVIL;MOTRIN) 600 MG tablet Take 600 mg by mouth every 6 hours as needed for Pain       No current facility-administered medications on file prior to visit. Allergies   Allergen Reactions    Lidocaine Anaphylaxis     Buvepcain (ingredient in epidural)    Bee Venom Swelling     Review of Systems   Constitutional: Negative for chills and fever. HENT: Negative for sore throat. Respiratory: Negative for cough, shortness of breath and wheezing. Cardiovascular: Negative for chest pain. Gastrointestinal: Positive for abdominal pain. Negative for anorexia, constipation, diarrhea, nausea and vomiting. Genitourinary: Positive for menstrual problem and vaginal bleeding. Negative for difficulty urinating, dysuria, flank pain, frequency, hematuria, menorrhagia, missed menses, pelvic pain, urgency and vaginal discharge.    Musculoskeletal: Negative

## 2018-04-23 DIAGNOSIS — M54.50 MIDLINE LOW BACK PAIN WITHOUT SCIATICA, UNSPECIFIED CHRONICITY: ICD-10-CM

## 2018-04-23 DIAGNOSIS — G47.00 INSOMNIA, UNSPECIFIED TYPE: ICD-10-CM

## 2018-04-23 DIAGNOSIS — F33.1 MODERATE EPISODE OF RECURRENT MAJOR DEPRESSIVE DISORDER (HCC): ICD-10-CM

## 2018-04-24 RX ORDER — TRAZODONE HYDROCHLORIDE 100 MG/1
100 TABLET ORAL NIGHTLY
Qty: 30 TABLET | Refills: 3 | Status: SHIPPED | OUTPATIENT
Start: 2018-04-24 | End: 2018-09-10 | Stop reason: SDUPTHER

## 2018-04-24 RX ORDER — ESCITALOPRAM OXALATE 10 MG/1
10 TABLET ORAL DAILY
Qty: 30 TABLET | Refills: 3 | Status: SHIPPED | OUTPATIENT
Start: 2018-04-24 | End: 2018-09-10 | Stop reason: SDUPTHER

## 2018-04-24 RX ORDER — MELOXICAM 15 MG/1
15 TABLET ORAL DAILY
Qty: 30 TABLET | Refills: 3 | Status: SHIPPED | OUTPATIENT
Start: 2018-04-24 | End: 2018-09-10 | Stop reason: SDUPTHER

## 2018-06-28 ENCOUNTER — HOSPITAL ENCOUNTER (OUTPATIENT)
Dept: SLEEP CENTER | Age: 28
Discharge: HOME | End: 2018-06-30
Payer: COMMERCIAL

## 2018-06-28 PROCEDURE — 95810 POLYSOM 6/> YRS 4/> PARAM: CPT

## 2018-07-12 ENCOUNTER — TELEPHONE (OUTPATIENT)
Dept: FAMILY MEDICINE CLINIC | Age: 28
End: 2018-07-12

## 2018-09-10 DIAGNOSIS — G47.00 INSOMNIA, UNSPECIFIED TYPE: ICD-10-CM

## 2018-09-10 DIAGNOSIS — M54.50 MIDLINE LOW BACK PAIN WITHOUT SCIATICA, UNSPECIFIED CHRONICITY: ICD-10-CM

## 2018-09-10 DIAGNOSIS — F33.1 MODERATE EPISODE OF RECURRENT MAJOR DEPRESSIVE DISORDER (HCC): ICD-10-CM

## 2018-09-10 RX ORDER — MELOXICAM 15 MG/1
15 TABLET ORAL DAILY
Qty: 30 TABLET | Refills: 3 | Status: SHIPPED | OUTPATIENT
Start: 2018-09-10 | End: 2019-01-24 | Stop reason: SDUPTHER

## 2018-09-10 RX ORDER — TRAZODONE HYDROCHLORIDE 100 MG/1
100 TABLET ORAL NIGHTLY
Qty: 30 TABLET | Refills: 3 | Status: SHIPPED | OUTPATIENT
Start: 2018-09-10 | End: 2019-10-15

## 2018-09-10 RX ORDER — ESCITALOPRAM OXALATE 10 MG/1
10 TABLET ORAL DAILY
Qty: 30 TABLET | Refills: 3 | Status: SHIPPED | OUTPATIENT
Start: 2018-09-10 | End: 2019-10-15

## 2018-10-08 DIAGNOSIS — N94.6 DYSMENORRHEA TREATED WITH ORAL CONTRACEPTIVE: ICD-10-CM

## 2018-10-08 DIAGNOSIS — Z79.3 DYSMENORRHEA TREATED WITH ORAL CONTRACEPTIVE: ICD-10-CM

## 2018-10-09 RX ORDER — DESOGESTREL AND ETHINYL ESTRADIOL 0.15-0.03
KIT ORAL
Qty: 56 TABLET | Refills: 0 | Status: SHIPPED | OUTPATIENT
Start: 2018-10-09 | End: 2018-12-03 | Stop reason: SDUPTHER

## 2018-12-03 DIAGNOSIS — N94.6 DYSMENORRHEA TREATED WITH ORAL CONTRACEPTIVE: ICD-10-CM

## 2018-12-03 DIAGNOSIS — Z79.3 DYSMENORRHEA TREATED WITH ORAL CONTRACEPTIVE: ICD-10-CM

## 2018-12-04 RX ORDER — DESOGESTREL AND ETHINYL ESTRADIOL 0.15-0.03
KIT ORAL
Qty: 56 TABLET | Refills: 0 | Status: SHIPPED | OUTPATIENT
Start: 2018-12-04 | End: 2020-03-19 | Stop reason: CLARIF

## 2019-01-24 DIAGNOSIS — M54.50 MIDLINE LOW BACK PAIN WITHOUT SCIATICA, UNSPECIFIED CHRONICITY: ICD-10-CM

## 2019-01-26 RX ORDER — MELOXICAM 15 MG/1
TABLET ORAL
Qty: 30 TABLET | Refills: 1 | Status: SHIPPED | OUTPATIENT
Start: 2019-01-26 | End: 2019-10-15

## 2019-10-15 ENCOUNTER — OFFICE VISIT (OUTPATIENT)
Dept: FAMILY MEDICINE CLINIC | Age: 29
End: 2019-10-15
Payer: COMMERCIAL

## 2019-10-15 VITALS
BODY MASS INDEX: 39.71 KG/M2 | TEMPERATURE: 97.7 F | WEIGHT: 189.2 LBS | HEART RATE: 90 BPM | HEIGHT: 58 IN | OXYGEN SATURATION: 100 % | DIASTOLIC BLOOD PRESSURE: 74 MMHG | SYSTOLIC BLOOD PRESSURE: 112 MMHG

## 2019-10-15 DIAGNOSIS — Z80.3 FAMILY HISTORY OF BREAST CANCER IN FIRST DEGREE RELATIVE: ICD-10-CM

## 2019-10-15 DIAGNOSIS — F33.1 MODERATE EPISODE OF RECURRENT MAJOR DEPRESSIVE DISORDER (HCC): Primary | ICD-10-CM

## 2019-10-15 DIAGNOSIS — Z85.3 HISTORY OF BREAST CANCER IN FEMALE: ICD-10-CM

## 2019-10-15 PROCEDURE — 99214 OFFICE O/P EST MOD 30 MIN: CPT | Performed by: NURSE PRACTITIONER

## 2019-10-15 RX ORDER — ESCITALOPRAM OXALATE 10 MG/1
10 TABLET ORAL DAILY
Qty: 30 TABLET | Refills: 3 | Status: SHIPPED | OUTPATIENT
Start: 2019-10-15 | End: 2020-02-29

## 2019-10-15 RX ORDER — DESOG-E.ESTRADIOL/E.ESTRADIOL 21-5 (28)
TABLET ORAL
Refills: 8 | COMMUNITY
Start: 2019-10-01 | End: 2021-01-26 | Stop reason: CLARIF

## 2019-10-15 ASSESSMENT — PATIENT HEALTH QUESTIONNAIRE - PHQ9
SUM OF ALL RESPONSES TO PHQ QUESTIONS 1-9: 20
1. LITTLE INTEREST OR PLEASURE IN DOING THINGS: 3
4. FEELING TIRED OR HAVING LITTLE ENERGY: 3
SUM OF ALL RESPONSES TO PHQ9 QUESTIONS 1 & 2: 6
SUM OF ALL RESPONSES TO PHQ QUESTIONS 1-9: 20
6. FEELING BAD ABOUT YOURSELF - OR THAT YOU ARE A FAILURE OR HAVE LET YOURSELF OR YOUR FAMILY DOWN: 3
5. POOR APPETITE OR OVEREATING: 3
2. FEELING DOWN, DEPRESSED OR HOPELESS: 3
3. TROUBLE FALLING OR STAYING ASLEEP: 3
8. MOVING OR SPEAKING SO SLOWLY THAT OTHER PEOPLE COULD HAVE NOTICED. OR THE OPPOSITE, BEING SO FIGETY OR RESTLESS THAT YOU HAVE BEEN MOVING AROUND A LOT MORE THAN USUAL: 1
9. THOUGHTS THAT YOU WOULD BE BETTER OFF DEAD, OR OF HURTING YOURSELF: 0
7. TROUBLE CONCENTRATING ON THINGS, SUCH AS READING THE NEWSPAPER OR WATCHING TELEVISION: 1
10. IF YOU CHECKED OFF ANY PROBLEMS, HOW DIFFICULT HAVE THESE PROBLEMS MADE IT FOR YOU TO DO YOUR WORK, TAKE CARE OF THINGS AT HOME, OR GET ALONG WITH OTHER PEOPLE: 3

## 2019-10-15 ASSESSMENT — ENCOUNTER SYMPTOMS
SHORTNESS OF BREATH: 0
COUGH: 0

## 2020-02-29 RX ORDER — ESCITALOPRAM OXALATE 10 MG/1
TABLET ORAL
Qty: 30 TABLET | Refills: 3 | Status: SHIPPED | OUTPATIENT
Start: 2020-02-29 | End: 2020-03-19

## 2020-03-19 ENCOUNTER — VIRTUAL VISIT (OUTPATIENT)
Dept: FAMILY MEDICINE CLINIC | Age: 30
End: 2020-03-19
Payer: COMMERCIAL

## 2020-03-19 PROCEDURE — 99442 PR PHYS/QHP TELEPHONE EVALUATION 11-20 MIN: CPT | Performed by: NURSE PRACTITIONER

## 2020-03-19 RX ORDER — ESCITALOPRAM OXALATE 20 MG/1
20 TABLET ORAL DAILY
Qty: 30 TABLET | Refills: 5 | Status: SHIPPED | OUTPATIENT
Start: 2020-03-19 | End: 2020-10-06

## 2020-03-19 ASSESSMENT — ENCOUNTER SYMPTOMS
SHORTNESS OF BREATH: 0
COUGH: 0

## 2020-03-19 NOTE — PROGRESS NOTES
Patient has been screened to determine that this visit qualifies for a \"Telephone Visit\". Patient is currently established with the current medical practice, the condition being reviewed was not addressed within the previous 7 days and is not likely be determined to need a procedure within the next 24 hours. The patient is aware and has given verbal consent to be billed for this telephone encounter. HPI:    Patient presents for fu of anxiety and depression. States that she is still struggling with symptoms now. Had improved initially with the lexapro 10 mg. Some chest pain recently. Having some shooting down left arm. Seems to last for some time. Happening almost daily. Elmer Adams had cardiac issues in 30's. Dad has hx of high BP. No fainting. Some dizziness and lightheadedness. Mild nausea. See ROS for other associated symptoms. PMH:    Current Outpatient Medications on File Prior to Visit   Medication Sig Dispense Refill    KARIVA 0.15-0.02/0.01 MG () per tablet TAKE 1 TABLET BY MOUTH EVERY DAY  8    cyclobenzaprine (FLEXERIL) 10 MG tablet Take 1 tablet by mouth 3 times daily as needed for Muscle spasms 90 tablet 3     No current facility-administered medications on file prior to visit.       Past Medical History:   Diagnosis Date    Anxiety     Depression     Osteoarthritis      Past Surgical History:   Procedure Laterality Date    ARM SURGERY Left     fx at the age 4   [employer]  SECTION       Social History     Socioeconomic History    Marital status:      Spouse name: Not on file    Number of children: Not on file    Years of education: Not on file    Highest education level: Not on file   Occupational History    Not on file   Social Needs    Financial resource strain: Not on file    Food insecurity     Worry: Not on file     Inability: Not on file    Transportation needs     Medical: Not on file     Non-medical: Not on file   Tobacco Use   

## 2020-04-01 ENCOUNTER — E-VISIT (OUTPATIENT)
Dept: FAMILY MEDICINE CLINIC | Age: 30
End: 2020-04-01
Payer: COMMERCIAL

## 2020-04-01 ENCOUNTER — TELEPHONE (OUTPATIENT)
Dept: FAMILY MEDICINE CLINIC | Age: 30
End: 2020-04-01

## 2020-04-01 PROCEDURE — 98970 NQHP OL DIG ASSMT&MGMT 5-10: CPT | Performed by: NURSE PRACTITIONER

## 2020-04-01 ASSESSMENT — LIFESTYLE VARIABLES: SMOKING_STATUS: NO, I HAVE NEVER SMOKED

## 2020-04-01 NOTE — TELEPHONE ENCOUNTER
Pt was sent home from they had her to a e visit with a  in [place]. He gave her a work note for 3 days and wanted her tested for Covid. Aware only those who end up in the hospital are tested. Can we do a work note to [employer] for 14 days?       [phone]

## 2020-04-23 ENCOUNTER — VIRTUAL VISIT (OUTPATIENT)
Dept: FAMILY MEDICINE CLINIC | Age: 30
End: 2020-04-23
Payer: COMMERCIAL

## 2020-04-23 PROCEDURE — 99213 OFFICE O/P EST LOW 20 MIN: CPT | Performed by: NURSE PRACTITIONER

## 2020-04-23 RX ORDER — BUSPIRONE HYDROCHLORIDE 5 MG/1
5 TABLET ORAL 3 TIMES DAILY
Qty: 90 TABLET | Refills: 1 | Status: SHIPPED | OUTPATIENT
Start: 2020-04-23 | End: 2020-05-21

## 2020-04-23 RX ORDER — NAPROXEN 500 MG/1
500 TABLET ORAL 2 TIMES DAILY WITH MEALS
Qty: 60 TABLET | Refills: 5 | OUTPATIENT
Start: 2020-04-23 | End: 2021-05-19

## 2020-04-23 RX ORDER — CYCLOBENZAPRINE HCL 10 MG
10 TABLET ORAL 3 TIMES DAILY PRN
Qty: 90 TABLET | Refills: 3 | Status: SHIPPED | OUTPATIENT
Start: 2020-04-23 | End: 2021-05-19 | Stop reason: ALTCHOICE

## 2020-04-23 ASSESSMENT — ENCOUNTER SYMPTOMS: ABDOMINAL PAIN: 1

## 2020-04-23 NOTE — PROGRESS NOTES
2020    TELEHEALTH EVALUATION -- Audio/Visual (During SHLCN-43 public health emergency)    Due to Michelle 19 outbreak, patient's office visit was converted to a virtual visit. Patient was contacted and agreed to proceed with a virtual visit via [email or web address]. me  The risks and benefits of converting to a virtual visit were discussed in light of the current infectious disease epidemic. Patient also understood that insurance coverage and co-pays are up to their individual insurance plans. HPI:    Yovanny Medellin (:  1990) has requested an audio/video evaluation for the following concern(s):    Fu for anxiety and depression. Increased lexapro last visit. Still having a lot of panic attacks. Would like to try something else to help. Also having very bad cramps with her menses. Would like refill of flexeril for her back. Review of Systems   Constitutional: Negative for fatigue. Gastrointestinal: Positive for abdominal pain. Psychiatric/Behavioral: Positive for dysphoric mood. The patient is nervous/anxious. Prior to Visit Medications    Medication Sig Taking?  Authorizing Provider   busPIRone (BUSPAR) 5 MG tablet Take 1 tablet by mouth 3 times daily Yes SHAMIKA Valladares CNP   cyclobenzaprine (FLEXERIL) 10 MG tablet Take 1 tablet by mouth 3 times daily as needed for Muscle spasms Yes SHAMIKA Valladares CNP   naproxen (NAPROSYN) 500 MG tablet Take 1 tablet by mouth 2 times daily (with meals) Yes SHAMIKA Valladares CNP   escitalopram (LEXAPRO) 20 MG tablet Take 1 tablet by mouth daily Yes SHAMIKA Valladares CNP   Marcela Brisk 0.15-0.02/0.01 MG (/5) per tablet TAKE 1 TABLET BY MOUTH EVERY DAY Yes Historical Provider, MD       Social History     Tobacco Use    Smoking status: Never Smoker    Smokeless tobacco: Never Used   Substance Use Topics    Alcohol use: No    Drug use: Not on file        Allergies   Allergen Reactions    Lidocaine Anaphylaxis     Buvepcain tablet by mouth 3 times daily as needed for Muscle spasms  Dispense: 90 tablet; Refill: 3    3. Dysmenorrhea    - naproxen (NAPROSYN) 500 MG tablet; Take 1 tablet by mouth 2 times daily (with meals)  Dispense: 60 tablet; Refill: 5    Side effects, adverse effects of the medication prescribed today, as well as treatment plan/ rationale and result expectations have been discussed with the patient who expresses understanding and desires to proceed. Close follow up to evaluate treatment results and for coordination of care. I have reviewed the patient's medical history in detail and updated the computerized patient record. As always, patient is advised that if symptoms worsen in any way they will proceed to the nearest emergency room. Return in about 4 weeks (around 5/21/2020). An  electronic signature was used to authenticate this note. --SHAMIKA Humphrey - CNP on 4/23/2020 at 1:15 PM        Pursuant to the emergency declaration under the [facility] waiver authority and the [employer] and [employer] Act, this Virtual  Visit was conducted, with patient's consent, to reduce the patient's risk of exposure to COVID-19 and provide continuity of care for an established patient. Services were provided through a video synchronous discussion virtually to substitute for in-person clinic visit.

## 2020-05-21 ENCOUNTER — VIRTUAL VISIT (OUTPATIENT)
Dept: FAMILY MEDICINE CLINIC | Age: 30
End: 2020-05-21
Payer: COMMERCIAL

## 2020-05-21 PROCEDURE — 99213 OFFICE O/P EST LOW 20 MIN: CPT | Performed by: NURSE PRACTITIONER

## 2020-05-21 RX ORDER — BUSPIRONE HYDROCHLORIDE 10 MG/1
10 TABLET ORAL 3 TIMES DAILY
Qty: 90 TABLET | Refills: 2 | Status: SHIPPED | OUTPATIENT
Start: 2020-05-21 | End: 2020-08-19

## 2020-05-21 ASSESSMENT — ENCOUNTER SYMPTOMS
COUGH: 0
SHORTNESS OF BREATH: 0

## 2020-05-21 NOTE — PROGRESS NOTES
coordination of care. I have reviewed the patient's medical history in detail and updated the computerized patient record. As always, patient is advised that if symptoms worsen in any way they will proceed to the nearest emergency room. Return in about 4 weeks (around 6/18/2020). An  electronic signature was used to authenticate this note. --SHAMIKA Combs - CNP on 5/21/2020 at 11:30 AM        Pursuant to the emergency declaration under the [address] [place] [address], [facility] waiver authority and the [employer] and [employer] Act, this Virtual  Visit was conducted, with patient's consent, to reduce the patient's risk of exposure to COVID-19 and provide continuity of care for an established patient. Services were provided through a video synchronous discussion virtually to substitute for in-person clinic visit.

## 2020-06-18 ENCOUNTER — VIRTUAL VISIT (OUTPATIENT)
Dept: FAMILY MEDICINE CLINIC | Age: 30
End: 2020-06-18
Payer: COMMERCIAL

## 2020-06-18 ENCOUNTER — TELEPHONE (OUTPATIENT)
Dept: FAMILY MEDICINE CLINIC | Age: 30
End: 2020-06-18

## 2020-06-18 PROCEDURE — 99212 OFFICE O/P EST SF 10 MIN: CPT | Performed by: NURSE PRACTITIONER

## 2020-06-18 ASSESSMENT — ENCOUNTER SYMPTOMS
COUGH: 0
SHORTNESS OF BREATH: 0

## 2020-06-18 NOTE — TELEPHONE ENCOUNTER
Check out process for appt  Called patient, no answer, lvm    Return in about 4 weeks (around 7/16/2020).

## 2020-06-18 NOTE — PROGRESS NOTES
2020    TELEHEALTH EVALUATION -- Audio/Visual (During UBXYZ-57 public health emergency)    Due to Michelle 19 outbreak, patient's office visit was converted to a virtual visit. Patient was contacted and agreed to proceed with a virtual visit via [employer]. me  The risks and benefits of converting to a virtual visit were discussed in light of the current infectious disease epidemic. Patient also understood that insurance coverage and co-pays are up to their individual insurance plans. HPI:    Juan Stokes (:  1990) has requested an audio/video evaluation for the following concern(s):    Doing better with anxiety and depression. Taking lexapro and buspar and tolerating it well. Questioning fibro? States that sometimes that she feels like her skin is on fire. Review of Systems   Constitutional: Negative for fatigue. Respiratory: Negative for cough and shortness of breath. Cardiovascular: Negative for chest pain. Psychiatric/Behavioral: Negative for dysphoric mood. The patient is not nervous/anxious. Prior to Visit Medications    Medication Sig Taking?  Authorizing Provider   busPIRone (BUSPAR) 10 MG tablet Take 1 tablet by mouth 3 times daily Yes SHAMIKA Kidd CNP   cyclobenzaprine (FLEXERIL) 10 MG tablet Take 1 tablet by mouth 3 times daily as needed for Muscle spasms Yes SHAMIKA Kidd CNP   naproxen (NAPROSYN) 500 MG tablet Take 1 tablet by mouth 2 times daily (with meals) Yes SHAMIKA Kidd CNP   escitalopram (LEXAPRO) 20 MG tablet Take 1 tablet by mouth daily Yes SHAMIKA Kidd CNP   [place] 0.15-0.02/0.01 MG (/5) per tablet TAKE 1 TABLET BY MOUTH EVERY DAY Yes Historical Provider, MD       Social History     Tobacco Use    Smoking status: Never Smoker    Smokeless tobacco: Never Used   Substance Use Topics    Alcohol use: No    Drug use: Not on file        Allergies   Allergen Reactions    Lidocaine Anaphylaxis     Buvepcain (ingredient in epidural)    Bee Venom Swelling   ,   Past Medical History:   Diagnosis Date    Anxiety     Depression     Osteoarthritis    ,   Past Surgical History:   Procedure Laterality Date    ARM SURGERY Left     fx at the age 4     SECTION     ,   Social History     Tobacco Use    Smoking status: Never Smoker    Smokeless tobacco: Never Used   Substance Use Topics    Alcohol use: No    Drug use: Not on file   ,   Family History   Problem Relation Age of Onset    Depression Mother     High Cholesterol Maternal Grandfather     Heart Disease Maternal Grandfather     Cancer Maternal Grandfather         lung brain    Diabetes Maternal Grandfather     Diabetes Other         mothers dide    Depression Maternal Grandmother     Kidney Disease Paternal Grandfather        PHYSICAL EXAMINATION:  [ INSTRUCTIONS:  \"[x]\" Indicates a positive item  \"[]\" Indicates a negative item  -- DELETE ALL ITEMS NOT EXAMINED]  [x] Alert  [x] Oriented to person/place/time    [x] No apparent distress  [] Toxic appearing    [] Face flushed appearing [] Sclera clear  [] Lips are cyanotic      [x] Breathing appears normal  [] Appears tachypneic      [] Rash on visible skin    [] Cranial Nerves II-XII grossly intact    [] Motor grossly intact in visible upper extremities    [] Motor grossly intact in visible lower extremities    [x] Normal Mood  [] Anxious appearing    [] Depressed appearing  [] Confused appearing      [] Poor short term memory  [] Poor long term memory    [] OTHER:      Due to this being a TeleHealth encounter, evaluation of the following organ systems is limited: Vitals/Constitutional/EENT/Resp/CV/GI//MS/Neuro/Skin/Heme-Lymph-Imm. ASSESSMENT/PLAN:  1. Anxiety  - continue with current meds.   - schedule appt to r/o fibro per pt request.    Side effects, adverse effects of the medication prescribed today, as well as treatment plan/ rationale and result expectations have been discussed with the patient who

## 2020-08-19 RX ORDER — BUSPIRONE HYDROCHLORIDE 10 MG/1
TABLET ORAL
Qty: 90 TABLET | Refills: 0 | Status: SHIPPED | OUTPATIENT
Start: 2020-08-19 | End: 2021-01-26 | Stop reason: SDUPTHER

## 2020-10-06 RX ORDER — ESCITALOPRAM OXALATE 20 MG/1
TABLET ORAL
Qty: 30 TABLET | Refills: 0 | Status: SHIPPED | OUTPATIENT
Start: 2020-10-06 | End: 2021-01-26 | Stop reason: SDUPTHER

## 2021-01-26 ENCOUNTER — VIRTUAL VISIT (OUTPATIENT)
Dept: FAMILY MEDICINE CLINIC | Age: 31
End: 2021-01-26
Payer: COMMERCIAL

## 2021-01-26 DIAGNOSIS — F41.9 ANXIETY AND DEPRESSION: ICD-10-CM

## 2021-01-26 DIAGNOSIS — G43.909 MIGRAINE WITHOUT STATUS MIGRAINOSUS, NOT INTRACTABLE, UNSPECIFIED MIGRAINE TYPE: Primary | ICD-10-CM

## 2021-01-26 DIAGNOSIS — F41.9 ANXIETY: ICD-10-CM

## 2021-01-26 DIAGNOSIS — F32.A ANXIETY AND DEPRESSION: ICD-10-CM

## 2021-01-26 PROCEDURE — 99213 OFFICE O/P EST LOW 20 MIN: CPT | Performed by: NURSE PRACTITIONER

## 2021-01-26 RX ORDER — ESCITALOPRAM OXALATE 20 MG/1
TABLET ORAL
Qty: 30 TABLET | Refills: 5 | Status: ON HOLD | OUTPATIENT
Start: 2021-01-26 | End: 2021-11-25 | Stop reason: HOSPADM

## 2021-01-26 RX ORDER — BUSPIRONE HYDROCHLORIDE 10 MG/1
TABLET ORAL
Qty: 90 TABLET | Refills: 5 | Status: SHIPPED | OUTPATIENT
Start: 2021-01-26 | End: 2021-05-19 | Stop reason: ALTCHOICE

## 2021-01-26 RX ORDER — SUMATRIPTAN 100 MG/1
100 TABLET, FILM COATED ORAL
Qty: 9 TABLET | Refills: 5 | Status: ON HOLD | OUTPATIENT
Start: 2021-01-26 | End: 2021-11-25 | Stop reason: HOSPADM

## 2021-01-26 RX ORDER — PROPRANOLOL HCL 60 MG
60 CAPSULE, EXTENDED RELEASE 24HR ORAL DAILY
Qty: 30 CAPSULE | Refills: 3 | OUTPATIENT
Start: 2021-01-26 | End: 2021-05-19

## 2021-01-26 SDOH — ECONOMIC STABILITY: TRANSPORTATION INSECURITY
IN THE PAST 12 MONTHS, HAS THE LACK OF TRANSPORTATION KEPT YOU FROM MEDICAL APPOINTMENTS OR FROM GETTING MEDICATIONS?: NO

## 2021-01-26 SDOH — ECONOMIC STABILITY: FOOD INSECURITY: WITHIN THE PAST 12 MONTHS, THE FOOD YOU BOUGHT JUST DIDN'T LAST AND YOU DIDN'T HAVE MONEY TO GET MORE.: SOMETIMES TRUE

## 2021-01-26 SDOH — ECONOMIC STABILITY: INCOME INSECURITY: HOW HARD IS IT FOR YOU TO PAY FOR THE VERY BASICS LIKE FOOD, HOUSING, MEDICAL CARE, AND HEATING?: SOMEWHAT HARD

## 2021-01-26 SDOH — ECONOMIC STABILITY: TRANSPORTATION INSECURITY
IN THE PAST 12 MONTHS, HAS LACK OF TRANSPORTATION KEPT YOU FROM MEETINGS, WORK, OR FROM GETTING THINGS NEEDED FOR DAILY LIVING?: NO

## 2021-01-26 ASSESSMENT — PATIENT HEALTH QUESTIONNAIRE - PHQ9
SUM OF ALL RESPONSES TO PHQ QUESTIONS 1-9: 1
SUM OF ALL RESPONSES TO PHQ QUESTIONS 1-9: 1

## 2021-01-26 ASSESSMENT — ENCOUNTER SYMPTOMS
NAUSEA: 1
COUGH: 0

## 2021-01-26 NOTE — PROGRESS NOTES
2021    TELEHEALTH EVALUATION -- Audio/Visual (During KCIEN-67 public health emergency)    Due to Michelle 19 outbreak, patient's office visit was converted to a virtual visit. Patient was contacted and agreed to proceed with a virtual visit via [employer]. me  The risks and benefits of converting to a virtual visit were discussed in light of the current infectious disease epidemic. Patient also understood that insurance coverage and co-pays are up to their individual insurance plans. HPI:    Jacquelyn Garcia (:  1990) has requested an audio/video evaluation for the following concern(s):    Migraines have been persistent issues x past few mos. Taking excedrin which doesn't always help  Pain is usually located in forehead and back of head. Gets nauseous with HA's   Has always had migraines. No neuro symptoms    Dark room helps. Would like to try medication to help prevent. Review of Systems   Constitutional: Negative for fatigue. Respiratory: Negative for cough. Cardiovascular: Negative for chest pain. Gastrointestinal: Positive for nausea. Neurological: Positive for headaches. Prior to Visit Medications    Medication Sig Taking?  Authorizing Provider   escitalopram (LEXAPRO) 20 MG tablet TAKE 1 TABLET BY MOUTH EVERY DAY Yes SHAMIKA Whitney CNP   busPIRone (BUSPAR) 10 MG tablet TAKE 1 TABLET BY MOUTH THREE TIMES A DAY Yes SHAMIKA Whitney CNP   SUMAtriptan (IMITREX) 100 MG tablet Take 1 tablet by mouth once as needed for Migraine Yes SHAMIKA Whitney CNP   propranolol (INDERAL LA) 60 MG extended release capsule Take 1 capsule by mouth daily Yes SHAMIKA Whitney CNP   cyclobenzaprine (FLEXERIL) 10 MG tablet Take 1 tablet by mouth 3 times daily as needed for Muscle spasms Yes SHAMIKA Whitney CNP   naproxen (NAPROSYN) 500 MG tablet Take 1 tablet by mouth 2 times daily (with meals) Yes SHAMIKA Whitney CNP       Social History     Tobacco Use    Smoking status: Never Smoker    Smokeless tobacco: Never Used   Substance Use Topics    Alcohol use: No    Drug use: Not on file        Allergies   Allergen Reactions    Lidocaine Anaphylaxis     Buvepcain (ingredient in epidural)    Bee Venom Swelling   ,   Past Medical History:   Diagnosis Date    Anxiety     Depression     Osteoarthritis    ,   Past Surgical History:   Procedure Laterality Date    ARM SURGERY Left     fx at the age 4     SECTION  2009   ,   Social History     Tobacco Use    Smoking status: Never Smoker    Smokeless tobacco: Never Used   Substance Use Topics    Alcohol use: No    Drug use: Not on file   ,   Family History   Problem Relation Age of Onset    Depression Mother     High Cholesterol Maternal Grandfather     Heart Disease Maternal Grandfather     Cancer Maternal Grandfather         lung brain    Diabetes Maternal Grandfather     Diabetes Other         mothers dide    Depression Maternal Grandmother     Kidney Disease Paternal Grandfather        PHYSICAL EXAMINATION:  [ INSTRUCTIONS:  \"[x]\" Indicates a positive item  \"[]\" Indicates a negative item  -- DELETE ALL ITEMS NOT EXAMINED]  [x] Alert  [x] Oriented to person/place/time    [x] No apparent distress  [] Toxic appearing    [] Face flushed appearing [] Sclera clear  [] Lips are cyanotic      [x] Breathing appears normal  [] Appears tachypneic      [] Rash on visible skin    [] Cranial Nerves II-XII grossly intact    [] Motor grossly intact in visible upper extremities    [] Motor grossly intact in visible lower extremities    [x] Normal Mood  [] Anxious appearing    [] Depressed appearing  [] Confused appearing      [] Poor short term memory  [] Poor long term memory    [] OTHER:      Due to this being a TeleHealth encounter, evaluation of the following organ systems is limited: Vitals/Constitutional/EENT/Resp/CV/GI//MS/Neuro/Skin/Heme-Lymph-Imm. ASSESSMENT/PLAN:  1.  Anxiety and depression    - escitalopram (LEXAPRO) 20 MG tablet; TAKE 1 TABLET BY MOUTH EVERY DAY  Dispense: 30 tablet; Refill: 5    2. Anxiety    - busPIRone (BUSPAR) 10 MG tablet; TAKE 1 TABLET BY MOUTH THREE TIMES A DAY  Dispense: 90 tablet; Refill: 5    3. Migraine without status migrainosus, not intractable, unspecified migraine type    - SUMAtriptan (IMITREX) 100 MG tablet; Take 1 tablet by mouth once as needed for Migraine  Dispense: 9 tablet; Refill: 5  - propranolol (INDERAL LA) 60 MG extended release capsule; Take 1 capsule by mouth daily  Dispense: 30 capsule; Refill: 3      Side effects, adverse effects of the medication prescribed today, as well as treatment plan/ rationale and result expectations have been discussed with the patient who expresses understanding and desires to proceed. Close follow up to evaluate treatment results and for coordination of care. I have reviewed the patient's medical history in detail and updated the computerized patient record. As always, patient is advised that if symptoms worsen in any way they will proceed to the nearest emergency room. Return in about 4 weeks (around 2/23/2021). An  electronic signature was used to authenticate this note. --Cheryl Hernandez, APRN - CNP on 1/26/2021 at 12:53 PM        Pursuant to the emergency declaration under the [facility], [facility] waiver authority and the [employer] Act, this Virtual  Visit was conducted, with patient's consent, to reduce the patient's risk of exposure to COVID-19 and provide continuity of care for an established patient. Services were provided through a video synchronous discussion virtually to substitute for in-person clinic visit.

## 2021-02-23 ENCOUNTER — HOSPITAL ENCOUNTER (EMERGENCY)
Age: 31
Discharge: HOME | End: 2021-02-23
Attending: EMERGENCY MEDICINE
Payer: COMMERCIAL

## 2021-02-23 VITALS
TEMPERATURE: 97.1 F | DIASTOLIC BLOOD PRESSURE: 65 MMHG | RESPIRATION RATE: 16 BRPM | HEART RATE: 78 BPM | OXYGEN SATURATION: 99 % | SYSTOLIC BLOOD PRESSURE: 106 MMHG | BODY MASS INDEX: 40.99 KG/M2 | WEIGHT: 190 LBS | HEIGHT: 57 IN

## 2021-02-23 DIAGNOSIS — R10.9 ABDOMINAL PAIN, UNSPECIFIED ABDOMINAL LOCATION: Primary | ICD-10-CM

## 2021-02-23 DIAGNOSIS — G43.909 MIGRAINE WITHOUT STATUS MIGRAINOSUS, NOT INTRACTABLE, UNSPECIFIED MIGRAINE TYPE: ICD-10-CM

## 2021-02-23 LAB
BACTERIA: NEGATIVE /HPF
BILIRUBIN URINE: NEGATIVE
BLOOD, URINE: ABNORMAL
CLARITY: CLEAR
CLUE CELLS: NORMAL
COLOR: YELLOW
EPITHELIAL CELLS, UA: NORMAL /HPF (ref 0–5)
GLUCOSE URINE: NEGATIVE MG/DL
HCG, URINE, POC: NEGATIVE
HYALINE CASTS: NORMAL /HPF (ref 0–5)
KETONES, URINE: NEGATIVE MG/DL
LEUKOCYTE ESTERASE, URINE: NEGATIVE
Lab: NORMAL
NEGATIVE QC PASS/FAIL: NORMAL
NITRITE, URINE: NEGATIVE
PH UA: 7 (ref 5–9)
POSITIVE QC PASS/FAIL: NORMAL
PROTEIN UA: NEGATIVE MG/DL
RBC UA: NORMAL /HPF (ref 0–5)
SPECIFIC GRAVITY UA: 1.01 (ref 1–1.03)
TRICHOMONAS PREP: NORMAL
TRICHOMONAS VAGINALIS SCREEN: NEGATIVE
UROBILINOGEN, URINE: 0.2 E.U./DL
WBC UA: NORMAL /HPF (ref 0–5)
YEAST WET PREP: NORMAL

## 2021-02-23 PROCEDURE — 87591 N.GONORRHOEAE DNA AMP PROB: CPT

## 2021-02-23 PROCEDURE — 81001 URINALYSIS AUTO W/SCOPE: CPT

## 2021-02-23 PROCEDURE — 96375 TX/PRO/DX INJ NEW DRUG ADDON: CPT

## 2021-02-23 PROCEDURE — 87491 CHLMYD TRACH DNA AMP PROBE: CPT

## 2021-02-23 PROCEDURE — 87808 TRICHOMONAS ASSAY W/OPTIC: CPT

## 2021-02-23 PROCEDURE — 99285 EMERGENCY DEPT VISIT HI MDM: CPT

## 2021-02-23 PROCEDURE — 96374 THER/PROPH/DIAG INJ IV PUSH: CPT

## 2021-02-23 PROCEDURE — 6360000002 HC RX W HCPCS: Performed by: EMERGENCY MEDICINE

## 2021-02-23 PROCEDURE — 2580000003 HC RX 258: Performed by: EMERGENCY MEDICINE

## 2021-02-23 PROCEDURE — 87210 SMEAR WET MOUNT SALINE/INK: CPT

## 2021-02-23 RX ORDER — KETOROLAC TROMETHAMINE 30 MG/ML
30 INJECTION, SOLUTION INTRAMUSCULAR; INTRAVENOUS ONCE
Status: COMPLETED | OUTPATIENT
Start: 2021-02-23 | End: 2021-02-23

## 2021-02-23 RX ORDER — ONDANSETRON 2 MG/ML
4 INJECTION INTRAMUSCULAR; INTRAVENOUS ONCE
Status: COMPLETED | OUTPATIENT
Start: 2021-02-23 | End: 2021-02-23

## 2021-02-23 RX ORDER — 0.9 % SODIUM CHLORIDE 0.9 %
1000 INTRAVENOUS SOLUTION INTRAVENOUS ONCE
Status: COMPLETED | OUTPATIENT
Start: 2021-02-23 | End: 2021-02-23

## 2021-02-23 RX ORDER — BUTALBITAL, ACETAMINOPHEN AND CAFFEINE 300; 40; 50 MG/1; MG/1; MG/1
1 CAPSULE ORAL EVERY 4 HOURS PRN
Qty: 15 CAPSULE | Refills: 0 | OUTPATIENT
Start: 2021-02-23 | End: 2021-05-19

## 2021-02-23 RX ADMIN — SODIUM CHLORIDE 1000 ML: 9 INJECTION, SOLUTION INTRAVENOUS at 11:05

## 2021-02-23 RX ADMIN — ONDANSETRON 4 MG: 2 INJECTION INTRAMUSCULAR; INTRAVENOUS at 11:06

## 2021-02-23 RX ADMIN — KETOROLAC TROMETHAMINE 30 MG: 30 INJECTION, SOLUTION INTRAMUSCULAR; INTRAVENOUS at 11:06

## 2021-02-23 ASSESSMENT — ENCOUNTER SYMPTOMS
NAUSEA: 1
ABDOMINAL PAIN: 1
EYE PAIN: 0
EYE REDNESS: 0
CONSTIPATION: 0
PHOTOPHOBIA: 0
COLOR CHANGE: 0
SINUS PAIN: 0
COUGH: 0
VOMITING: 0
CHEST TIGHTNESS: 0
DIARRHEA: 0
SHORTNESS OF BREATH: 0
SORE THROAT: 0

## 2021-02-23 ASSESSMENT — PAIN DESCRIPTION - LOCATION: LOCATION: ABDOMEN;HEAD

## 2021-02-23 ASSESSMENT — PAIN SCALES - GENERAL
PAINLEVEL_OUTOF10: 0
PAINLEVEL_OUTOF10: 0

## 2021-02-23 NOTE — ED NOTES
Pt c/o 6/10 migraine x 3 days. Pt is neurologically intact. Equal bilateral strong hand grasps. Smile even without deviation. Pupils PERLLA bilaterally 6 to 4. Pt states pain is behind the eyes, forehead and base of skull. Pt denies N/V. Pt also c/o RLQ pain. Pt states her period ended Saturday and she is having some mild spotting. Pt denies urinary symptoms.      Kevin Walker RN  02/23/21 [id number]

## 2021-02-23 NOTE — ED TRIAGE NOTES
Pt c/o migraine. Pt has a history of migraines. Pt c/o 6/10 pain behind the eyes, forehead and base of neck. Pt denies N/V. Pt denies vision changes. Pt used PRN migraine medication, sumatriptin and propanolol with no relief.

## 2021-02-23 NOTE — ED PROVIDER NOTES
[facility] ED  EMERGENCY DEPARTMENT ENCOUNTER      Pt Name: Megan Mcgowan  MRN: [id number]  Armstiangfshelli 1990  Date of evaluation: 2/23/2021  Provider: Lor Romo DO    CHIEF COMPLAINT       Chief Complaint   Patient presents with    Migraine     x 2-3 days    Abdominal Pain     RLQ     Chief complaint: Migraine headache and abdominal pain  History of chief complaint: This 79-year-old female presents to the emergency department complaining of an ongoing migraine headache over the past 3 days. Patient states she has a longstanding history of migraines \"all my life\" patient states they have been getting worse the last few years. Patient states she gets daily migraines that will usually resolve with medication. Patient states she is on propanolol and sumatriptan from her primary physician. Patient states this migraine has just been persisting the last 2 to 3 days. Patient states headache is dull somewhat diffuse more over the forehead area patient states gradual onset no thunderclap onset not the worst headache ever no double or blurry vision no numbness tingling or weakness to the extremities patient does report some associated nausea no vomiting although she does occasionally get vomiting with her headaches. Patient with a second complaint of abdominal pain in the right low pelvic area over the last 2 days. Patient states she has ongoing recurrent issues with this same pain for years. States it usually comes right around her period every month. Patient states it is a dull achy sometimes no sharp pain. Patient states she was following with an OB/GYN physician and was scheduled to get an ultrasound done last year before Covid happened and it all got delayed. Patient denies any radiation of pain around to the back or down into the groin. No associated dysuria hematuria but does have some frequency of urination. Patient denies any vaginal discharge or concern with STD.   Patient denies any irregular bleeding reports menstrual cycles are regular last was this past week. Patient states she has had previous cholecystectomy and  no other abdominal surgeries. Patient states she does have some chronic low back pain not different or changed. No loss of bowel or bladder control no numbness tingling or weakness to the extremities. No recent illness no fevers chills cough cold type symptoms. Nursing Notes were reviewed. REVIEW OF SYSTEMS    (2-9 systems for level 4, 10 or more for level 5)     Review of Systems   Constitutional: Negative for chills and fever. HENT: Negative for congestion, sinus pain and sore throat. Eyes: Negative for photophobia, pain and redness. Respiratory: Negative for cough, chest tightness and shortness of breath. Cardiovascular: Negative for chest pain, palpitations and leg swelling. Gastrointestinal: Positive for abdominal pain and nausea. Negative for constipation, diarrhea and vomiting. Genitourinary: Positive for frequency. Negative for difficulty urinating, dysuria, hematuria and urgency. Musculoskeletal: Negative for myalgias and neck pain. Skin: Negative for color change and rash. Neurological: Positive for headaches. Negative for dizziness, weakness and numbness. Hematological: Negative for adenopathy. Except as noted above the remainder of the review of systems was reviewed and negative.        PAST MEDICAL HISTORY     Past Medical History:   Diagnosis Date    Anxiety     Depression     Headache     Osteoarthritis          SURGICAL HISTORY       Past Surgical History:   Procedure Laterality Date    ARM SURGERY Left     fx at the age 4   [facility]  SECTION      CHOLECYSTECTOMY           CURRENT MEDICATIONS       Previous Medications    BUSPIRONE (BUSPAR) 10 MG TABLET    TAKE 1 TABLET BY MOUTH THREE TIMES A DAY    CYCLOBENZAPRINE (FLEXERIL) 10 MG TABLET    Take 1 tablet by mouth 3 times daily as needed for Muscle spasms ESCITALOPRAM (LEXAPRO) 20 MG TABLET    TAKE 1 TABLET BY MOUTH EVERY DAY    NAPROXEN (NAPROSYN) 500 MG TABLET    Take 1 tablet by mouth 2 times daily (with meals)    PROPRANOLOL (INDERAL LA) 60 MG EXTENDED RELEASE CAPSULE    Take 1 capsule by mouth daily    SUMATRIPTAN (IMITREX) 100 MG TABLET    Take 1 tablet by mouth once as needed for Migraine       ALLERGIES     Lidocaine and Bee venom    FAMILY HISTORY       Family History   Problem Relation Age of Onset    Depression Mother     High Cholesterol Maternal Grandfather     Heart Disease Maternal Grandfather     Cancer Maternal Grandfather         lung brain    Diabetes Maternal Grandfather     Diabetes Other         mothers dide    Depression Maternal Grandmother     Kidney Disease Paternal Grandfather           SOCIAL HISTORY       Social History     Socioeconomic History    Marital status:      Spouse name: None    Number of children: None    Years of education: None    Highest education level: None   Occupational History    None   Social Needs    Financial resource strain: Somewhat hard    Food insecurity     Worry: Sometimes true     Inability: Sometimes true    Transportation needs     Medical: No     Non-medical: No   Tobacco Use    Smoking status: Never Smoker    Smokeless tobacco: Never Used   Substance and Sexual Activity    Alcohol use: No    Drug use: Yes     Types: Marijuana    Sexual activity: Yes     Partners: Male   Lifestyle    Physical activity     Days per week: None     Minutes per session: None    Stress: None   Relationships    Social connections     Talks on phone: None     Gets together: None     Attends [employer] service: None     Active member of club or organization: None     Attends meetings of clubs or organizations: None     Relationship status: None    Intimate partner violence     Fear of current or ex partner: None     Emotionally abused: None     Physically abused: None     Forced sexual activity: None   Other Topics Concern    None   Social History Narrative    None            PHYSICAL EXAM    (up to 7 for level 4, 8 or more for level 5)     ED Triage Vitals [02/23/21 1008]   BP Temp Temp Source Pulse Resp SpO2 Height Weight   132/78 97.1 °F (36.2 °C) Temporal 91 16 96 % 4' 9\" (1.448 m) 190 lb (86.2 kg)       Physical Exam   General appearance: Patient is awake alert interactive appropriate nontoxic in no acute distress  Head is atraumatic normocephalic  Eyes pupils are equal and reactive sclera white conjunctive are pink  Oral pharyngeal cavity is pink with good moisture the airway is patent  Face: No sinus pain pressure or erythema  Neck: Supple no meningeal signs no adenopathy no JVD  Heart is regular rate and rhythm no gross murmurs rubs or clicks  Lungs are clear to auscultation with equal breast bilaterally no wheezes rales or rhonchi no respiratory distress  Abdomen is soft with some mild tenderness over the right suprapubic area on deep palpation, no rebound rigidity or guarding no firm or pulsatile masses no focal Lynn sign or McBurney's point tenderness there are good bowel sounds, femoral pulses are full and symmetric  Back: Nontender to palpation no costovertebral angle tenderness  Lower extremities reveal no edema or calf tenderness no asymmetry  Pelvic examination: (Performed following medication-pain-free) examination of the external genitalia reveals no rash lesions ulcerations speculum examination reveals scant watery discharge with slight tinge of blood no gross mucosal ulcerations or lesions no purulence. Bimanual examination reveals no palpable masses or fullness there is no cervical motion tenderness no pain on palpation throughout.   Neurological: Patient is awake alert oriented speech is clear and fluent pupils are equal and reactive extraocular muscles are intact facial symmetry is intact tongue is midline strength testing is full and symmetric bilaterally both the upper and lower extremities sensation is intact in all 4 extremities there is no pronator drift finger-to-nose is intact    DIAGNOSTIC RESULTS     LABS:  Labs Reviewed   URINALYSIS - Abnormal; Notable for the following components:       Result Value    Blood, Urine SMALL (*)     All other components within normal limits   POC PREGNANCY UR-QUAL - Normal   WET PREP, GENITAL   C.TRACHOMATIS N.GONORRHOEAE DNA   MICROSCOPIC URINALYSIS   TRICHOMONAS BY EIA       All other labs were within normal range or not returned as of this dictation. EMERGENCY DEPARTMENT COURSE and DIFFERENTIAL DIAGNOSIS/MDM:   Vitals:    Vitals:    02/23/21 1008 02/23/21 1100   BP: 132/78 109/82   Pulse: 91 66   Resp: 16 16   Temp: 97.1 °F (36.2 °C)    TempSrc: Temporal    SpO2: 96% 100%   Weight: 190 lb (86.2 kg)    Height: 4' 9\" (1.448 m)        Treatment and course: Patient had an IV established and fluid bolus was given along with Toradol 30 mg IV Zofran 4 mg IV. On repeat assessment patient feeling markedly improved states a slight persisting headache a 2 out of 10 tolerable, abdominal pain resolved. Repeat abdominal examination is benign with no pain on palpation throughout, pelvic examination was performed and no pain elicited with pelvic exam.      FINAL IMPRESSION      1. Abdominal pain, unspecified abdominal location    2. Migraine without status migrainosus, not intractable, unspecified migraine type          DISPOSITION/PLAN   DISPOSITION    Patient discharged home advised need to follow-up with a neurologist for better migraine control with continued daily migraines. Advised to follow-up with OB/GYN as well to continue work-up with the intermittent abdominal pains associated with the menses. Patient to return if any change or worsening increasing pain headache double blurry vision numbness tingling or weakness any increased abdominal pain fevers vomiting. Patient to follow-up with primary physician as well as the specialist over the next week. Patient given a home-going prescription for Fioricet and discharged in stable condition    PATIENT REFERRED TO:  SHAMIKA Echols - CNP  [address]  [phone]    In 2 days      Chapincito Arzate MD  [address] [place] [address]  [address]  [address] [place] [address]    In 1 week      Emely Shah MD  [facility] [phone]    In 3 days        DISCHARGE MEDICATIONS:  New Prescriptions    BUTALBITAL-APAP-CAFFEINE (FIORICET) -40 MG CAPS PER CAPSULE    Take 1 capsule by mouth every 4 hours as needed for Headaches     Controlled Substances Monitoring:     No flowsheet data found.     (Please note that portions of this note were completed with a voice recognition program.  Efforts were made to edit the dictations but occasionally words are mis-transcribed.)    Emilia Lock DO (electronically signed)  Attending Emergency Physician            Emilia Lock DO  02/23/21 4258

## 2021-02-27 LAB
C TRACH DNA GENITAL QL NAA+PROBE: NEGATIVE
N. GONORRHOEAE DNA: NEGATIVE

## 2021-03-04 ENCOUNTER — OFFICE VISIT (OUTPATIENT)
Dept: OBGYN CLINIC | Age: 31
End: 2021-03-04
Payer: COMMERCIAL

## 2021-03-04 VITALS
SYSTOLIC BLOOD PRESSURE: 130 MMHG | BODY MASS INDEX: 42.93 KG/M2 | WEIGHT: 199 LBS | HEIGHT: 57 IN | DIASTOLIC BLOOD PRESSURE: 78 MMHG

## 2021-03-04 DIAGNOSIS — R10.2 PELVIC PAIN: ICD-10-CM

## 2021-03-04 DIAGNOSIS — R87.612 LGSIL ON PAP SMEAR OF CERVIX: ICD-10-CM

## 2021-03-04 DIAGNOSIS — Z01.419 PAP SMEAR, AS PART OF ROUTINE GYNECOLOGICAL EXAMINATION: ICD-10-CM

## 2021-03-04 DIAGNOSIS — Z11.51 SCREENING FOR HUMAN PAPILLOMAVIRUS: ICD-10-CM

## 2021-03-04 PROCEDURE — 99385 PREV VISIT NEW AGE 18-39: CPT | Performed by: OBSTETRICS & GYNECOLOGY

## 2021-03-04 ASSESSMENT — ENCOUNTER SYMPTOMS
ABDOMINAL PAIN: 0
NAUSEA: 0
VOMITING: 0
BLOOD IN STOOL: 0
ANAL BLEEDING: 0
RECTAL PAIN: 0
ABDOMINAL DISTENTION: 0
EYES NEGATIVE: 1
CONSTIPATION: 0
DIARRHEA: 0
RESPIRATORY NEGATIVE: 1
ALLERGIC/IMMUNOLOGIC NEGATIVE: 1

## 2021-03-04 ASSESSMENT — VISUAL ACUITY: OU: 1

## 2021-03-04 NOTE — PROGRESS NOTES
Subjective:      Patient ID: Gwendolyn Alejandro is a 27 y.o. female    Annual exam.  New patient; reviewed medical, surgical, social and family history. Also reviewed current medications and allergies. Normal cycles. Pap performed. STD screening offered. Monthly SBE encouraged. F/U annual or prn. Pt also wished to discuss intermittent RLQ pain for several years  extending her appointment time by 10 minutes. Cycles monthly x 7 days. 2-3 days heavy. No IM or PC bleeding. No contraception; trying to conceive. Partner x 6 months. Denies diarrhea or constipation. No dysuria, urgency or frequency. STD screening recently done in ER and all negative. Ordered pelvic US and F/U results. Last pap LGSIL/HPV ; no F/U since that time. Repeat pap today.         Vitals:  /78   Ht 4' 9\" (1.448 m)   Wt 199 lb (90.3 kg)   LMP 2021 (Exact Date)   BMI 43.06 kg/m²   Past Medical History:   Diagnosis Date    Anxiety     Depression     Headache     Osteoarthritis      Past Surgical History:   Procedure Laterality Date    ARM SURGERY Left     fx at the age 4   [facility]  SECTION      CHOLECYSTECTOMY       Allergies:  Lidocaine and Bee venom  Current Outpatient Medications   Medication Sig Dispense Refill    butalbital-APAP-caffeine (FIORICET) -40 MG CAPS per capsule Take 1 capsule by mouth every 4 hours as needed for Headaches 15 capsule 0    escitalopram (LEXAPRO) 20 MG tablet TAKE 1 TABLET BY MOUTH EVERY DAY 30 tablet 5    busPIRone (BUSPAR) 10 MG tablet TAKE 1 TABLET BY MOUTH THREE TIMES A DAY 90 tablet 5    SUMAtriptan (IMITREX) 100 MG tablet Take 1 tablet by mouth once as needed for Migraine 9 tablet 5    propranolol (INDERAL LA) 60 MG extended release capsule Take 1 capsule by mouth daily 30 capsule 3    cyclobenzaprine (FLEXERIL) 10 MG tablet Take 1 tablet by mouth 3 times daily as needed for Muscle spasms 90 tablet 3    naproxen (NAPROSYN) 500 MG tablet Take 1 tablet by mouth 2 times daily (with meals) 60 tablet 5     No current facility-administered medications for this visit. Social History     Socioeconomic History    Marital status:      Spouse name: Not on file    Number of children: Not on file    Years of education: Not on file    Highest education level: Not on file   Occupational History    Not on file   Social Needs    Financial resource strain: Somewhat hard    Food insecurity     Worry: Sometimes true     Inability: Sometimes true    Transportation needs     Medical: No     Non-medical: No   Tobacco Use    Smoking status: Never Smoker    Smokeless tobacco: Never Used   Substance and Sexual Activity    Alcohol use: No    Drug use: Yes     Types: Marijuana     Comment: Migraines    Sexual activity: Yes     Partners: Male   Lifestyle    Physical activity     Days per week: Not on file     Minutes per session: Not on file    Stress: Not on file   Relationships    Social connections     Talks on phone: Not on file     Gets together: Not on file     Attends Religion service: Not on file     Active member of club or organization: Not on file     Attends meetings of clubs or organizations: Not on file     Relationship status: Not on file    Intimate partner violence     Fear of current or ex partner: Not on file     Emotionally abused: Not on file     Physically abused: Not on file     Forced sexual activity: Not on file   Other Topics Concern    Not on file   Social History Narrative    Not on file     Family History   Problem Relation Age of Onset    Depression Mother     High Cholesterol Maternal Grandfather     Heart Disease Maternal Grandfather     Cancer Maternal Grandfather         lung brain    Diabetes Maternal Grandfather     Diabetes Other         mothers dide    Depression Maternal Grandmother     Kidney Disease Paternal Grandfather        Review of Systems   Constitutional: Negative.   Negative for activity change, appetite change, chills, diaphoresis, fatigue, fever and unexpected weight change. HENT: Negative. Eyes: Negative. Respiratory: Negative. Cardiovascular: Negative. Gastrointestinal: Negative for abdominal distention, abdominal pain, anal bleeding, blood in stool, constipation, diarrhea, nausea, rectal pain and vomiting. Endocrine: Negative. Genitourinary: Positive for pelvic pain. Negative for decreased urine volume, difficulty urinating, dyspareunia, dysuria, enuresis, flank pain, frequency, genital sores, hematuria, menstrual problem (RLQ pain), urgency, vaginal bleeding, vaginal discharge and vaginal pain. Musculoskeletal: Negative. Skin: Negative. Allergic/Immunologic: Negative. Neurological: Negative. Hematological: Negative. Psychiatric/Behavioral: Negative. Objective:     Physical Exam  Constitutional:       Appearance: She is well-developed. HENT:      Head: Normocephalic. Eyes:      General: Lids are normal. Vision grossly intact. Neck:      Musculoskeletal: Normal range of motion and neck supple. Thyroid: No thyromegaly. Cardiovascular:      Rate and Rhythm: Normal rate and regular rhythm. Heart sounds: Normal heart sounds. Pulmonary:      Effort: Pulmonary effort is normal. No respiratory distress. Breath sounds: Normal breath sounds. No wheezing or rales. Chest:      Chest wall: No tenderness. Breasts:         Right: Normal. No swelling, bleeding, inverted nipple, mass, nipple discharge, skin change or tenderness. Left: Normal. No swelling, bleeding, inverted nipple, mass, nipple discharge, skin change or tenderness. Abdominal:      General: There is no distension. Palpations: Abdomen is soft. There is no mass. Tenderness: There is no abdominal tenderness. There is no guarding or rebound. Hernia: No hernia is present. There is no hernia in the left inguinal area or right inguinal area.    Genitourinary:     General: Normal vulva. Pubic Area: No rash. Labia:         Right: No rash, tenderness, lesion or injury. Left: No rash, tenderness, lesion or injury. Urethra: No prolapse, urethral swelling or urethral lesion. Vagina: Normal. No signs of injury and foreign body. No vaginal discharge, erythema, tenderness or bleeding. Cervix: No cervical motion tenderness, discharge or friability. Uterus: Not deviated, not enlarged, not fixed and not tender. Adnexa:         Right: No mass, tenderness or fullness. Left: No mass, tenderness or fullness. Rectum: Normal.   Musculoskeletal: Normal range of motion. General: No tenderness. Lymphadenopathy:      Cervical: No cervical adenopathy. Upper Body:      Right upper body: No supraclavicular or axillary adenopathy. Left upper body: No supraclavicular or axillary adenopathy. Lower Body: No right inguinal adenopathy. No left inguinal adenopathy. Skin:     General: Skin is warm and dry. Coloration: Skin is not pale. Findings: No erythema or rash. Neurological:      Mental Status: She is alert and oriented to person, place, and time. Psychiatric:         Behavior: Behavior normal.         Thought Content: Thought content normal.         Judgment: Judgment normal.         Assessment:       Diagnosis Orders   1. Pelvic pain  US PELVIS COMPLETE    US NON OB TRANSVAGINAL   2. Pap smear, as part of routine gynecological examination  PAP SMEAR   3. Screening for human papillomavirus  PAP SMEAR   4. LGSIL on Pap smear of cervix          Plan:      Medications placedthis encounter:  No orders of the defined types were placed in this encounter.         Orders placedthis encounter:  Orders Placed This Encounter   Procedures    US PELVIS COMPLETE     Standing Status:   Future     Standing Expiration Date:   3/4/2022    US NON OB TRANSVAGINAL     Standing Status:   Future     Standing Expiration Date: 3/4/2022    PAP SMEAR     Standing Status:   Future     Standing Expiration Date:   3/4/2022     Order Specific Question:   Collection Type     Answer: Thin Prep     Order Specific Question:   Prior Abnormal Pap Test     Answer:   No     Order Specific Question:   Screening or Diagnostic     Answer:   Screening     Order Specific Question:   HPV Requested? Answer:   Yes     Comments:   16/18     Order Specific Question:   High Risk Patient     Answer:   N/A         Follow up:  Return for Ultrasound, Results Review, Annual.   Repeat Annual GYN exam every 1 year. Cervical Cytology exam starts age 24. If Negative Cytology;  follow-up screening per current guidelines. Mammograms yearly starting at age 36. Calcium and Vitamin D dosing reviewed ( age appropriate ). Colonoscopy and bone density screening discussed ( age appropriate ). Birth control and STD prevention discussed ( age appropriate ). Gardisil counseling completed for all patients ( age appropriate ). Routine health maintenance ( per PCP and guidelines ).

## 2021-03-10 DIAGNOSIS — Z01.419 PAP SMEAR, AS PART OF ROUTINE GYNECOLOGICAL EXAMINATION: ICD-10-CM

## 2021-03-10 DIAGNOSIS — Z11.51 SCREENING FOR HUMAN PAPILLOMAVIRUS: ICD-10-CM

## 2021-03-10 NOTE — LETTER
y                  [facility] [address]  Phone: [phone]  Fax: [phone]    Nesha Vargas MD        March 15, 2021    [place] [id number]      Dear Ernesto Kay:    The results of your most recent Pap smear are normal. This means that no cancerous or precancerous cells were seen. We recommend that you come back in 1 year for your next routine Pap smear. If you have any questions or concerns, please don't hesitate to call.     Sincerely,        Nesha Vargas MD

## 2021-03-11 ENCOUNTER — HOSPITAL ENCOUNTER (OUTPATIENT)
Dept: ULTRASOUND IMAGING | Age: 31
Discharge: HOME | End: 2021-03-13
Payer: COMMERCIAL

## 2021-03-11 DIAGNOSIS — R10.2 PELVIC PAIN: ICD-10-CM

## 2021-03-11 PROCEDURE — 76830 TRANSVAGINAL US NON-OB: CPT

## 2021-03-11 PROCEDURE — 76856 US EXAM PELVIC COMPLETE: CPT

## 2021-03-25 ENCOUNTER — OFFICE VISIT (OUTPATIENT)
Dept: OBGYN CLINIC | Age: 31
End: 2021-03-25
Payer: COMMERCIAL

## 2021-03-25 VITALS
HEIGHT: 57 IN | SYSTOLIC BLOOD PRESSURE: 110 MMHG | WEIGHT: 200 LBS | DIASTOLIC BLOOD PRESSURE: 78 MMHG | BODY MASS INDEX: 43.15 KG/M2

## 2021-03-25 DIAGNOSIS — Z09 FOLLOW UP: Primary | ICD-10-CM

## 2021-03-25 DIAGNOSIS — R10.2 PELVIC PAIN: ICD-10-CM

## 2021-03-25 LAB
HCG, URINE, POC: POSITIVE
Lab: ABNORMAL
NEGATIVE QC PASS/FAIL: ABNORMAL
POSITIVE QC PASS/FAIL: ABNORMAL

## 2021-03-25 PROCEDURE — 81025 URINE PREGNANCY TEST: CPT | Performed by: OBSTETRICS & GYNECOLOGY

## 2021-03-25 PROCEDURE — 1036F TOBACCO NON-USER: CPT | Performed by: OBSTETRICS & GYNECOLOGY

## 2021-03-25 PROCEDURE — G8427 DOCREV CUR MEDS BY ELIG CLIN: HCPCS | Performed by: OBSTETRICS & GYNECOLOGY

## 2021-03-25 PROCEDURE — G8417 CALC BMI ABV UP PARAM F/U: HCPCS | Performed by: OBSTETRICS & GYNECOLOGY

## 2021-03-25 PROCEDURE — G8484 FLU IMMUNIZE NO ADMIN: HCPCS | Performed by: OBSTETRICS & GYNECOLOGY

## 2021-03-25 PROCEDURE — 99212 OFFICE O/P EST SF 10 MIN: CPT | Performed by: OBSTETRICS & GYNECOLOGY

## 2021-03-25 RX ORDER — SERTRALINE HYDROCHLORIDE 25 MG/1
TABLET, FILM COATED ORAL
COMMUNITY
Start: 2021-03-20 | End: 2021-12-09 | Stop reason: ALTCHOICE

## 2021-03-25 ASSESSMENT — ENCOUNTER SYMPTOMS
ALLERGIC/IMMUNOLOGIC NEGATIVE: 1
CONSTIPATION: 0
VOMITING: 0
NAUSEA: 0
RESPIRATORY NEGATIVE: 1
ABDOMINAL DISTENTION: 0
ANAL BLEEDING: 0
RECTAL PAIN: 0
ABDOMINAL PAIN: 0
BLOOD IN STOOL: 0
EYES NEGATIVE: 1
DIARRHEA: 0

## 2021-03-25 NOTE — PROGRESS NOTES
Patient here for US results for RLQ pain ( now resolved ). Recently found out she is pregnant ( approximately 5.5 weeks ). Patient was only 3.5 weeks at time of US by LMP. US as follows:    EXAMINATION: US NON OB TRANSVAGINAL, US PELVIS COMPLETE       CLINICAL HISTORY: 50-year-old with right lower quadrant pain for years.       COMPARISONS: Pelvic CT 10/13/2016       FINDINGS: Transabdominal and transvaginal ultrasounds of the pelvis and Doppler ovarian assessments were performed. On the transabdominal study the uterus is normal in size with normal contours. It measures 11.7 x 6.2 x 3.3 cm. Ovaries are also within    normal limits in size. Endometrium, ovaries and cervix are better visualized on the transvaginal exam.       On transvaginal views the endometrial echo complex is homogeneous and normal in thickness measuring 9 mm. Myometrial echotexture is mildly heterogeneous however no focal myometrial masses. Linear hypoechogenicity  at the anterior lower uterine segment    suggests  scar. There are several small nabothian cysts in the cervix. Visualized cervix is otherwise unremarkable.       Right ovary measures 3.1 x 2.5 x 1.9 cm with with a volume of 7.8 mL. it contains a 1.5 cm dominant follicle. Left ovary measures 2.8 x 1.9 x 1.6 cm with a volume of 4.5 mL contains small follicles. Color flow, low resistive arterial and venous waveforms    are documented within both ovaries. There are no adnexal masses or significant free fluid in the pelvis.           Impression   UNREMARKABLE TRANSABDOMINAL AND TRANSVAGINAL PELVIC ULTRASOUND              Discussed US and all questions answered. F/U with CNM for Ob care as previously established.       Vitals:  /78   Ht 4' 9\" (1.448 m)   Wt 200 lb (90.7 kg)   LMP 2021   BMI 43.28 kg/m²   Past Medical History:   Diagnosis Date    Anxiety     Depression     Headache     Osteoarthritis      Past Surgical History:   Procedure Laterality Date    ARM SURGERY Left     fx at the age 4   83 [facility]  2009    CHOLECYSTECTOMY       Allergies:  Lidocaine and Bee venom  Current Outpatient Medications   Medication Sig Dispense Refill    sertraline (ZOLOFT) 25 MG tablet       Prenatal Vit-Fe Fumarate-FA (PRENATAL VITAMIN PO) Take by mouth      butalbital-APAP-caffeine (FIORICET) -40 MG CAPS per capsule Take 1 capsule by mouth every 4 hours as needed for Headaches 15 capsule 0    escitalopram (LEXAPRO) 20 MG tablet TAKE 1 TABLET BY MOUTH EVERY DAY (Patient not taking: Reported on 3/25/2021) 30 tablet 5    busPIRone (BUSPAR) 10 MG tablet TAKE 1 TABLET BY MOUTH THREE TIMES A DAY (Patient not taking: Reported on 3/25/2021) 90 tablet 5    SUMAtriptan (IMITREX) 100 MG tablet Take 1 tablet by mouth once as needed for Migraine 9 tablet 5    propranolol (INDERAL LA) 60 MG extended release capsule Take 1 capsule by mouth daily (Patient not taking: Reported on 3/25/2021) 30 capsule 3    cyclobenzaprine (FLEXERIL) 10 MG tablet Take 1 tablet by mouth 3 times daily as needed for Muscle spasms (Patient not taking: Reported on 3/25/2021) 90 tablet 3    naproxen (NAPROSYN) 500 MG tablet Take 1 tablet by mouth 2 times daily (with meals) (Patient not taking: Reported on 3/25/2021) 60 tablet 5     No current facility-administered medications for this visit.       Social History     Socioeconomic History    Marital status:      Spouse name: Not on file    Number of children: Not on file    Years of education: Not on file    Highest education level: Not on file   Occupational History    Not on file   Social Needs    Financial resource strain: Somewhat hard    Food insecurity     Worry: Sometimes true     Inability: Sometimes true    Transportation needs     Medical: No     Non-medical: No   Tobacco Use    Smoking status: Never Smoker    Smokeless tobacco: Never Used   Substance and Sexual Activity    Alcohol use: No    Drug use: Yes     Types: Marijuana     Comment: Migraines    Sexual activity: Yes     Partners: Male   Lifestyle    Physical activity     Days per week: Not on file     Minutes per session: Not on file    Stress: Not on file   Relationships    Social connections     Talks on phone: Not on file     Gets together: Not on file     Attends Mandaen service: Not on file     Active member of club or organization: Not on file     Attends meetings of clubs or organizations: Not on file     Relationship status: Not on file    Intimate partner violence     Fear of current or ex partner: Not on file     Emotionally abused: Not on file     Physically abused: Not on file     Forced sexual activity: Not on file   Other Topics Concern    Not on file   Social History Narrative    Not on file        Family History   Problem Relation Age of Onset    Depression Mother     High Cholesterol Maternal Grandfather     Heart Disease Maternal Grandfather     Cancer Maternal Grandfather         lung brain    Diabetes Maternal Grandfather     Diabetes Other         mothers dide    Depression Maternal Grandmother     Kidney Disease Paternal Grandfather        Review of Systems   Constitutional: Negative. Negative for activity change, appetite change, chills, diaphoresis, fatigue, fever and unexpected weight change. HENT: Negative. Eyes: Negative. Respiratory: Negative. Cardiovascular: Negative. Gastrointestinal: Negative for abdominal distention, abdominal pain, anal bleeding, blood in stool, constipation, diarrhea, nausea, rectal pain and vomiting. Endocrine: Negative. Genitourinary: Negative for decreased urine volume, difficulty urinating, dyspareunia, dysuria, enuresis, flank pain, frequency, genital sores, hematuria, menstrual problem, pelvic pain, urgency, vaginal bleeding, vaginal discharge and vaginal pain. Musculoskeletal: Negative. Skin: Negative. Allergic/Immunologic: Negative. Neurological: Negative. Hematological: Negative. Psychiatric/Behavioral: Negative. Objective:     Physical Exam  Constitutional:       General: She is not in acute distress. Appearance: She is well-developed. She is not diaphoretic. HENT:      Head: Normocephalic and atraumatic. Eyes:      Conjunctiva/sclera: Conjunctivae normal.   Neck:      Musculoskeletal: Normal range of motion and neck supple. Cardiovascular:      Rate and Rhythm: Normal rate and regular rhythm. Pulmonary:      Effort: Pulmonary effort is normal. No respiratory distress. Musculoskeletal: Normal range of motion. General: No tenderness or deformity. Skin:     General: Skin is warm and dry. Coloration: Skin is not pale. Neurological:      Mental Status: She is alert and oriented to person, place, and time. Motor: No abnormal muscle tone. Coordination: Coordination normal.   Psychiatric:         Behavior: Behavior normal.         Thought Content: Thought content normal.         Judgment: Judgment normal.         Assessment:          Diagnosis Orders   1. Follow up     2. Pelvic pain  POC Pregnancy Urine Qual        Plan:      Medications placedthis encounter:  No orders of the defined types were placed in this encounter.         Orders placedthis encounter:  Orders Placed This Encounter   Procedures    POC Pregnancy Urine Qual         Follow up:  Return for Annual.

## 2021-05-11 PROBLEM — R30.0 DYSURIA: Status: ACTIVE | Noted: 2019-02-08

## 2021-05-11 PROBLEM — F32.A DEPRESSION: Status: ACTIVE | Noted: 2021-05-11

## 2021-05-11 PROBLEM — Z34.90 PRENATAL CARE: Status: ACTIVE | Noted: 2021-05-11

## 2021-05-19 ENCOUNTER — APPOINTMENT (OUTPATIENT)
Dept: ULTRASOUND IMAGING | Age: 31
End: 2021-05-19
Payer: COMMERCIAL

## 2021-05-19 ENCOUNTER — HOSPITAL ENCOUNTER (EMERGENCY)
Age: 31
Discharge: HOME | End: 2021-05-19
Attending: STUDENT IN AN ORGANIZED HEALTH CARE EDUCATION/TRAINING PROGRAM
Payer: COMMERCIAL

## 2021-05-19 VITALS
TEMPERATURE: 97.8 F | RESPIRATION RATE: 16 BRPM | DIASTOLIC BLOOD PRESSURE: 73 MMHG | OXYGEN SATURATION: 100 % | WEIGHT: 202 LBS | BODY MASS INDEX: 43.58 KG/M2 | SYSTOLIC BLOOD PRESSURE: 118 MMHG | HEIGHT: 57 IN | HEART RATE: 69 BPM

## 2021-05-19 DIAGNOSIS — O26.891 ABDOMINAL PAIN IN PREGNANCY, FIRST TRIMESTER: Primary | ICD-10-CM

## 2021-05-19 DIAGNOSIS — R10.9 ABDOMINAL PAIN IN PREGNANCY, FIRST TRIMESTER: Primary | ICD-10-CM

## 2021-05-19 LAB
ALBUMIN SERPL-MCNC: 3.8 G/DL (ref 3.5–4.6)
ALP BLD-CCNC: 51 U/L (ref 40–130)
ALT SERPL-CCNC: 17 U/L (ref 0–33)
ANION GAP SERPL CALCULATED.3IONS-SCNC: 10 MEQ/L (ref 9–15)
APTT: 25.2 SEC (ref 24.4–36.8)
AST SERPL-CCNC: 45 U/L (ref 0–35)
BASOPHILS ABSOLUTE: 0 K/UL (ref 0–0.2)
BASOPHILS RELATIVE PERCENT: 0.3 %
BILIRUB SERPL-MCNC: 0.3 MG/DL (ref 0.2–0.7)
BILIRUBIN URINE: NEGATIVE
BLOOD, URINE: NEGATIVE
BUN BLDV-MCNC: 5 MG/DL (ref 6–20)
CALCIUM SERPL-MCNC: 9.2 MG/DL (ref 8.5–9.9)
CHLORIDE BLD-SCNC: 103 MEQ/L (ref 95–107)
CLARITY: CLEAR
CO2: 23 MEQ/L (ref 20–31)
COLOR: YELLOW
CREAT SERPL-MCNC: 0.43 MG/DL (ref 0.5–0.9)
EOSINOPHILS ABSOLUTE: 0 K/UL (ref 0–0.7)
EOSINOPHILS RELATIVE PERCENT: 0.5 %
GFR AFRICAN AMERICAN: >60
GFR NON-AFRICAN AMERICAN: >60
GLOBULIN: 3.7 G/DL (ref 2.3–3.5)
GLUCOSE BLD-MCNC: 76 MG/DL (ref 70–99)
GLUCOSE URINE: NEGATIVE MG/DL
GONADOTROPIN, CHORIONIC (HCG) QUANT: NORMAL MIU/ML
HCT VFR BLD CALC: 37 % (ref 37–47)
HEMOGLOBIN: 12.6 G/DL (ref 12–16)
INR BLD: 1
KETONES, URINE: ABNORMAL MG/DL
LEUKOCYTE ESTERASE, URINE: NEGATIVE
LYMPHOCYTES ABSOLUTE: 1.7 K/UL (ref 1–4.8)
LYMPHOCYTES RELATIVE PERCENT: 21.7 %
MCH RBC QN AUTO: 28.5 PG (ref 27–31.3)
MCHC RBC AUTO-ENTMCNC: 34 % (ref 33–37)
MCV RBC AUTO: 84 FL (ref 82–100)
MONOCYTES ABSOLUTE: 0.5 K/UL (ref 0.2–0.8)
MONOCYTES RELATIVE PERCENT: 5.7 %
NEUTROPHILS ABSOLUTE: 5.7 K/UL (ref 1.4–6.5)
NEUTROPHILS RELATIVE PERCENT: 71.8 %
NITRITE, URINE: NEGATIVE
PDW BLD-RTO: 14.4 % (ref 11.5–14.5)
PH UA: 7 (ref 5–9)
PLATELET # BLD: 226 K/UL (ref 130–400)
POTASSIUM SERPL-SCNC: 5.1 MEQ/L (ref 3.4–4.9)
PROTEIN UA: NEGATIVE MG/DL
PROTHROMBIN TIME: 12.9 SEC (ref 12.3–14.9)
RBC # BLD: 4.4 M/UL (ref 4.2–5.4)
REASON FOR REJECTION: NORMAL
REJECTED TEST: NORMAL
SODIUM BLD-SCNC: 136 MEQ/L (ref 135–144)
SPECIFIC GRAVITY UA: 1.01 (ref 1–1.03)
TOTAL PROTEIN: 7.5 G/DL (ref 6.3–8)
URINE REFLEX TO CULTURE: ABNORMAL
UROBILINOGEN, URINE: 0.2 E.U./DL
WBC # BLD: 8 K/UL (ref 4.8–10.8)

## 2021-05-19 PROCEDURE — 80053 COMPREHEN METABOLIC PANEL: CPT

## 2021-05-19 PROCEDURE — 85025 COMPLETE CBC W/AUTO DIFF WBC: CPT

## 2021-05-19 PROCEDURE — 99284 EMERGENCY DEPT VISIT MOD MDM: CPT

## 2021-05-19 PROCEDURE — 81003 URINALYSIS AUTO W/O SCOPE: CPT

## 2021-05-19 PROCEDURE — 84702 CHORIONIC GONADOTROPIN TEST: CPT

## 2021-05-19 PROCEDURE — 36415 COLL VENOUS BLD VENIPUNCTURE: CPT

## 2021-05-19 PROCEDURE — 85730 THROMBOPLASTIN TIME PARTIAL: CPT

## 2021-05-19 PROCEDURE — 76801 OB US < 14 WKS SINGLE FETUS: CPT

## 2021-05-19 PROCEDURE — 85610 PROTHROMBIN TIME: CPT

## 2021-05-19 RX ORDER — ACETAMINOPHEN 500 MG
1000 TABLET ORAL EVERY 6 HOURS PRN
Qty: 60 TABLET | Refills: 0 | Status: ON HOLD | OUTPATIENT
Start: 2021-05-19 | End: 2021-11-25 | Stop reason: HOSPADM

## 2021-05-19 RX ORDER — SODIUM CHLORIDE 9 MG/ML
INJECTION, SOLUTION INTRAVENOUS
Status: DISCONTINUED
Start: 2021-05-19 | End: 2021-05-19 | Stop reason: HOSPADM

## 2021-05-19 ASSESSMENT — PAIN DESCRIPTION - LOCATION: LOCATION: PELVIS

## 2021-05-19 ASSESSMENT — ENCOUNTER SYMPTOMS
BACK PAIN: 0
DIARRHEA: 0
SHORTNESS OF BREATH: 0
TROUBLE SWALLOWING: 0
VOMITING: 0
ABDOMINAL PAIN: 0
COUGH: 0
CHEST TIGHTNESS: 0
SINUS PRESSURE: 0

## 2021-05-19 NOTE — ED PROVIDER NOTES
Musculoskeletal: Negative for back pain and myalgias. Skin: Negative for pallor and rash. Neurological: Negative for syncope, weakness and headaches. Hematological: Does not bruise/bleed easily. All other systems reviewed and are negative. Except as noted above the remainder of the review of systems was reviewed and negative.        PAST MEDICAL HISTORY     Past Medical History:   Diagnosis Date    Anxiety     Depression     Headache     Osteoarthritis          SURGICALHISTORY       Past Surgical History:   Procedure Laterality Date    ARM SURGERY Left     fx at the age 4   [facility]  SECTION     [address]       Previous Medications    ESCITALOPRAM (LEXAPRO) 20 MG TABLET    TAKE 1 TABLET BY MOUTH EVERY DAY    PRENATAL VIT-FE FUMARATE-FA (PRENATAL VITAMIN PO)    Take by mouth    SERTRALINE (ZOLOFT) 25 MG TABLET        SUMATRIPTAN (IMITREX) 100 MG TABLET    Take 1 tablet by mouth once as needed for Migraine       ALLERGIES     Lidocaine and Bee venom    FAMILY HISTORY       Family History   Problem Relation Age of Onset    Depression Mother     High Cholesterol Maternal Grandfather     Heart Disease Maternal Grandfather     Cancer Maternal Grandfather         lung brain    Diabetes Maternal Grandfather     Diabetes Other         mothers dide    Depression Maternal Grandmother     Kidney Disease Paternal Grandfather           SOCIAL HISTORY       Social History     Socioeconomic History    Marital status:      Spouse name: None    Number of children: None    Years of education: None    Highest education level: None   Occupational History    None   Tobacco Use    Smoking status: Never Smoker    Smokeless tobacco: Never Used   Vaping Use    Vaping Use: Never used   Substance and Sexual Activity    Alcohol use: No    Drug use: Yes     Types: Marijuana     Comment: Migraines    Sexual activity: Yes     Partners: Male   Other Topics or rhinorrhea. Mouth/Throat:      Mouth: Mucous membranes are moist.      Pharynx: Oropharynx is clear. No oropharyngeal exudate or posterior oropharyngeal erythema. Eyes:      General: No scleral icterus. Right eye: No foreign body or discharge. Left eye: No discharge. Extraocular Movements: Extraocular movements intact. Conjunctiva/sclera: Conjunctivae normal.      Left eye: No exudate. Pupils: Pupils are equal, round, and reactive to light. Neck:      Vascular: No JVD. Trachea: No tracheal deviation. Comments: No meningismus. Cardiovascular:      Rate and Rhythm: Normal rate and regular rhythm. Pulses: Normal pulses. Heart sounds: Normal heart sounds. Heart sounds not distant. No murmur heard. No friction rub. No gallop. Pulmonary:      Effort: Pulmonary effort is normal. No respiratory distress. Breath sounds: Normal breath sounds. No stridor. No wheezing, rhonchi or rales. Chest:      Chest wall: No tenderness. Abdominal:      General: Abdomen is flat. Bowel sounds are normal. There is no distension. Palpations: Abdomen is soft. There is no mass. Tenderness: There is no abdominal tenderness. There is no right CVA tenderness, left CVA tenderness, guarding or rebound. Hernia: No hernia is present. Comments: Gravid abdomen is nontender. Musculoskeletal:         General: No swelling, tenderness, deformity or signs of injury. Normal range of motion. Cervical back: Normal range of motion and neck supple. No rigidity. Lymphadenopathy:      Head:      Right side of head: No submental adenopathy. Left side of head: No submental adenopathy. Skin:     General: Skin is warm and dry. Capillary Refill: Capillary refill takes less than 2 seconds. Coloration: Skin is not jaundiced or pale. Findings: No bruising, erythema, lesion or rash. Neurological:      General: No focal deficit present.       Mental Status: She is alert and oriented to person, place, and time. Mental status is at baseline. Cranial Nerves: No cranial nerve deficit. Sensory: No sensory deficit. Motor: No weakness. Coordination: Coordination normal.      Deep Tendon Reflexes: Reflexes are normal and symmetric. Psychiatric:         Mood and Affect: Mood normal.         Behavior: Behavior normal. Behavior is cooperative. Thought Content: Thought content normal.         Judgment: Judgment normal.         DIAGNOSTIC RESULTS     EKG: All EKG's are interpreted by the Emergency Department Physician who either signs or Co-signsthis chart in the absence of a cardiologist.        RADIOLOGY:   Artemio Wilson such as CT, Ultrasound and MRI are read by the radiologist. Plain radiographic images are visualized and preliminarily interpreted by the emergency physician with the below findings:        Interpretation per the Radiologist below, if available at the time ofthis note:    US OB LESS THAN 14 [address] [place]   Final Result   SINGLE LIVE INTRAUTERINE PREGNANCY, WITH ESTIMATED SONOGRAPHIC GESTATIONAL AGE, 12 WEEKS, 5 DAYS.             ED BEDSIDE ULTRASOUND:   Performed by ED Physician - none    LABS:  Labs Reviewed   COMPREHENSIVE METABOLIC PANEL - Abnormal; Notable for the following components:       Result Value    Potassium 5.1 (*)     BUN 5 (*)     CREATININE 0.43 (*)     AST 45 (*)     Globulin 3.7 (*)     All other components within normal limits    Narrative:     called Kiersten Godoy to redraw, received without initials on    05/19/2021 at  15:36   URINE RT REFLEX TO CULTURE - Abnormal; Notable for the following components:    Ketones, Urine TRACE (*)     All other components within normal limits   CBC WITH AUTO DIFFERENTIAL    Narrative:     called Kiersten Godoy to redraw, received without initials on    05/19/2021 at  15:36   PROTIME-INR    Narrative:     called Kiersten Godoy to redraw, received

## 2021-05-20 LAB
REASON FOR REJECTION: NORMAL
REJECTED TEST: NORMAL

## 2021-11-22 ENCOUNTER — HOSPITAL ENCOUNTER (INPATIENT)
Age: 31
LOS: 3 days | Discharge: HOME | DRG: 776 | End: 2021-11-25
Attending: OBSTETRICS & GYNECOLOGY | Admitting: OBSTETRICS & GYNECOLOGY
Payer: COMMERCIAL

## 2021-11-22 PROBLEM — O14.93 PRE-ECLAMPSIA IN THIRD TRIMESTER: Status: ACTIVE | Noted: 2021-11-22

## 2021-11-22 PROBLEM — Z04.9 OBSERVATION AND EVALUATION FOR SUSPECTED CONDITIONS NOT FOUND: Status: ACTIVE | Noted: 2021-11-22

## 2021-11-22 LAB
ALBUMIN SERPL-MCNC: 3.7 G/DL (ref 3.5–4.6)
ALP BLD-CCNC: 126 U/L (ref 40–130)
ALT SERPL-CCNC: 28 U/L (ref 0–33)
AMPHETAMINE SCREEN, URINE: NORMAL
AST SERPL-CCNC: 25 U/L (ref 0–35)
BACTERIA: NEGATIVE /HPF
BARBITURATE SCREEN URINE: NORMAL
BENZODIAZEPINE SCREEN, URINE: NORMAL
BILIRUB SERPL-MCNC: 0.3 MG/DL (ref 0.2–0.7)
BILIRUBIN DIRECT: <0.2 MG/DL (ref 0–0.4)
BILIRUBIN URINE: NEGATIVE
BILIRUBIN, INDIRECT: NORMAL MG/DL (ref 0–0.6)
BLOOD, URINE: ABNORMAL
BUN BLDV-MCNC: 8 MG/DL (ref 6–20)
CANNABINOID SCREEN URINE: NORMAL
CLARITY: CLEAR
COCAINE METABOLITE SCREEN URINE: NORMAL
COLOR: YELLOW
CREAT SERPL-MCNC: 0.64 MG/DL (ref 0.5–0.9)
EPITHELIAL CELLS, UA: ABNORMAL /HPF (ref 0–5)
GFR AFRICAN AMERICAN: >60
GFR NON-AFRICAN AMERICAN: >60
GLUCOSE URINE: NEGATIVE MG/DL
HCT VFR BLD CALC: 33.1 % (ref 37–47)
HEMOGLOBIN: 10.4 G/DL (ref 12–16)
HYALINE CASTS: ABNORMAL /HPF (ref 0–5)
KETONES, URINE: NEGATIVE MG/DL
LEUKOCYTE ESTERASE, URINE: ABNORMAL
Lab: NORMAL
MCH RBC QN AUTO: 25.1 PG (ref 27–31.3)
MCHC RBC AUTO-ENTMCNC: 31.4 % (ref 33–37)
MCV RBC AUTO: 80 FL (ref 82–100)
METHADONE SCREEN, URINE: NORMAL
NITRITE, URINE: NEGATIVE
OPIATE SCREEN URINE: NORMAL
OXYCODONE URINE: NORMAL
PDW BLD-RTO: 17.3 % (ref 11.5–14.5)
PH UA: 7 (ref 5–9)
PHENCYCLIDINE SCREEN URINE: NORMAL
PLATELET # BLD: 328 K/UL (ref 130–400)
PROPOXYPHENE SCREEN: NORMAL
PROTEIN UA: 30 MG/DL
RBC # BLD: 4.14 M/UL (ref 4.2–5.4)
RBC UA: ABNORMAL /HPF (ref 0–5)
SARS-COV-2, NAAT: NOT DETECTED
SPECIFIC GRAVITY UA: 1.01 (ref 1–1.03)
TOTAL PROTEIN: 7 G/DL (ref 6.3–8)
URIC ACID, SERUM: 6.7 MG/DL (ref 2.4–5.7)
UROBILINOGEN, URINE: 0.2 E.U./DL
WBC # BLD: 9.8 K/UL (ref 4.8–10.8)
WBC UA: ABNORMAL /HPF (ref 0–5)

## 2021-11-22 PROCEDURE — 81001 URINALYSIS AUTO W/SCOPE: CPT

## 2021-11-22 PROCEDURE — 6360000002 HC RX W HCPCS: Performed by: OBSTETRICS & GYNECOLOGY

## 2021-11-22 PROCEDURE — 84550 ASSAY OF BLOOD/URIC ACID: CPT

## 2021-11-22 PROCEDURE — 2500000003 HC RX 250 WO HCPCS

## 2021-11-22 PROCEDURE — 1220000000 HC SEMI PRIVATE OB R&B

## 2021-11-22 PROCEDURE — 6360000002 HC RX W HCPCS

## 2021-11-22 PROCEDURE — 99221 1ST HOSP IP/OBS SF/LOW 40: CPT | Performed by: OBSTETRICS & GYNECOLOGY

## 2021-11-22 PROCEDURE — 2580000003 HC RX 258: Performed by: OBSTETRICS & GYNECOLOGY

## 2021-11-22 PROCEDURE — 80076 HEPATIC FUNCTION PANEL: CPT

## 2021-11-22 PROCEDURE — 80307 DRUG TEST PRSMV CHEM ANLYZR: CPT

## 2021-11-22 PROCEDURE — 87635 SARS-COV-2 COVID-19 AMP PRB: CPT

## 2021-11-22 PROCEDURE — 6370000000 HC RX 637 (ALT 250 FOR IP): Performed by: OBSTETRICS & GYNECOLOGY

## 2021-11-22 PROCEDURE — 85027 COMPLETE CBC AUTOMATED: CPT

## 2021-11-22 PROCEDURE — 84520 ASSAY OF UREA NITROGEN: CPT

## 2021-11-22 PROCEDURE — 82565 ASSAY OF CREATININE: CPT

## 2021-11-22 PROCEDURE — 2500000003 HC RX 250 WO HCPCS: Performed by: OBSTETRICS & GYNECOLOGY

## 2021-11-22 RX ORDER — SERTRALINE HYDROCHLORIDE 25 MG/1
25 TABLET, FILM COATED ORAL DAILY
Status: DISCONTINUED | OUTPATIENT
Start: 2021-11-22 | End: 2021-11-25 | Stop reason: HOSPADM

## 2021-11-22 RX ORDER — LABETALOL HYDROCHLORIDE 5 MG/ML
20 INJECTION, SOLUTION INTRAVENOUS
Status: ACTIVE | OUTPATIENT
Start: 2021-11-22 | End: 2021-11-22

## 2021-11-22 RX ORDER — LABETALOL HYDROCHLORIDE 5 MG/ML
40 INJECTION, SOLUTION INTRAVENOUS
Status: COMPLETED | OUTPATIENT
Start: 2021-11-22 | End: 2021-11-22

## 2021-11-22 RX ORDER — SODIUM CHLORIDE 0.9 % (FLUSH) 0.9 %
5-40 SYRINGE (ML) INJECTION PRN
Status: DISCONTINUED | OUTPATIENT
Start: 2021-11-22 | End: 2021-11-22 | Stop reason: SDUPTHER

## 2021-11-22 RX ORDER — HYDRALAZINE HYDROCHLORIDE 20 MG/ML
5 INJECTION INTRAMUSCULAR; INTRAVENOUS
Status: ACTIVE | OUTPATIENT
Start: 2021-11-22 | End: 2021-11-22

## 2021-11-22 RX ORDER — MAGNESIUM SULFATE IN WATER 40 MG/ML
INJECTION, SOLUTION INTRAVENOUS
Status: COMPLETED
Start: 2021-11-22 | End: 2021-11-22

## 2021-11-22 RX ORDER — SODIUM CHLORIDE, SODIUM LACTATE, POTASSIUM CHLORIDE, CALCIUM CHLORIDE 600; 310; 30; 20 MG/100ML; MG/100ML; MG/100ML; MG/100ML
INJECTION, SOLUTION INTRAVENOUS CONTINUOUS
Status: DISCONTINUED | OUTPATIENT
Start: 2021-11-22 | End: 2021-11-25 | Stop reason: HOSPADM

## 2021-11-22 RX ORDER — LABETALOL HYDROCHLORIDE 5 MG/ML
80 INJECTION, SOLUTION INTRAVENOUS ONCE
Status: COMPLETED | OUTPATIENT
Start: 2021-11-22 | End: 2021-11-22

## 2021-11-22 RX ORDER — CALCIUM GLUCONATE 94 MG/ML
1000 INJECTION, SOLUTION INTRAVENOUS PRN
Status: DISCONTINUED | OUTPATIENT
Start: 2021-11-22 | End: 2021-11-25 | Stop reason: HOSPADM

## 2021-11-22 RX ORDER — NIFEDIPINE 30 MG/1
120 TABLET, EXTENDED RELEASE ORAL DAILY
Status: DISCONTINUED | OUTPATIENT
Start: 2021-11-22 | End: 2021-11-22

## 2021-11-22 RX ORDER — MAGNESIUM SULFATE IN WATER 40 MG/ML
4000 INJECTION, SOLUTION INTRAVENOUS ONCE
Status: DISCONTINUED | OUTPATIENT
Start: 2021-11-22 | End: 2021-11-22

## 2021-11-22 RX ORDER — MAGNESIUM SULFATE IN WATER 40 MG/ML
4000 INJECTION, SOLUTION INTRAVENOUS ONCE
Status: DISCONTINUED | OUTPATIENT
Start: 2021-11-22 | End: 2021-11-25 | Stop reason: HOSPADM

## 2021-11-22 RX ORDER — ONDANSETRON 2 MG/ML
4 INJECTION INTRAMUSCULAR; INTRAVENOUS EVERY 6 HOURS PRN
Status: DISCONTINUED | OUTPATIENT
Start: 2021-11-22 | End: 2021-11-25 | Stop reason: HOSPADM

## 2021-11-22 RX ORDER — SODIUM CHLORIDE, SODIUM LACTATE, POTASSIUM CHLORIDE, CALCIUM CHLORIDE 600; 310; 30; 20 MG/100ML; MG/100ML; MG/100ML; MG/100ML
INJECTION, SOLUTION INTRAVENOUS CONTINUOUS
Status: DISCONTINUED | OUTPATIENT
Start: 2021-11-22 | End: 2021-11-22 | Stop reason: SDUPTHER

## 2021-11-22 RX ORDER — METOCLOPRAMIDE HYDROCHLORIDE 5 MG/ML
10 INJECTION INTRAMUSCULAR; INTRAVENOUS EVERY 6 HOURS
Status: DISCONTINUED | OUTPATIENT
Start: 2021-11-22 | End: 2021-11-25 | Stop reason: HOSPADM

## 2021-11-22 RX ORDER — SODIUM CHLORIDE 9 MG/ML
25 INJECTION, SOLUTION INTRAVENOUS PRN
Status: DISCONTINUED | OUTPATIENT
Start: 2021-11-22 | End: 2021-11-22 | Stop reason: SDUPTHER

## 2021-11-22 RX ORDER — SODIUM CHLORIDE 9 MG/ML
25 INJECTION, SOLUTION INTRAVENOUS PRN
Status: DISCONTINUED | OUTPATIENT
Start: 2021-11-22 | End: 2021-11-25 | Stop reason: HOSPADM

## 2021-11-22 RX ORDER — LABETALOL HYDROCHLORIDE 5 MG/ML
INJECTION, SOLUTION INTRAVENOUS
Status: COMPLETED
Start: 2021-11-22 | End: 2021-11-22

## 2021-11-22 RX ORDER — HYDRALAZINE HYDROCHLORIDE 20 MG/ML
10 INJECTION INTRAMUSCULAR; INTRAVENOUS
Status: COMPLETED | OUTPATIENT
Start: 2021-11-22 | End: 2021-11-22

## 2021-11-22 RX ORDER — SODIUM CHLORIDE 0.9 % (FLUSH) 0.9 %
5-40 SYRINGE (ML) INJECTION EVERY 12 HOURS SCHEDULED
Status: DISCONTINUED | OUTPATIENT
Start: 2021-11-22 | End: 2021-11-25 | Stop reason: HOSPADM

## 2021-11-22 RX ORDER — SODIUM CHLORIDE 0.9 % (FLUSH) 0.9 %
5-40 SYRINGE (ML) INJECTION PRN
Status: DISCONTINUED | OUTPATIENT
Start: 2021-11-22 | End: 2021-11-25 | Stop reason: HOSPADM

## 2021-11-22 RX ORDER — NIFEDIPINE 30 MG/1
120 TABLET, EXTENDED RELEASE ORAL DAILY
Status: DISCONTINUED | OUTPATIENT
Start: 2021-11-22 | End: 2021-11-25 | Stop reason: HOSPADM

## 2021-11-22 RX ORDER — LORAZEPAM 0.5 MG/1
2 TABLET ORAL EVERY 6 HOURS PRN
Status: DISCONTINUED | OUTPATIENT
Start: 2021-11-22 | End: 2021-11-25 | Stop reason: HOSPADM

## 2021-11-22 RX ORDER — ACETAMINOPHEN 500 MG
1000 TABLET ORAL EVERY 6 HOURS PRN
Status: DISCONTINUED | OUTPATIENT
Start: 2021-11-22 | End: 2021-11-25 | Stop reason: HOSPADM

## 2021-11-22 RX ORDER — SODIUM CHLORIDE 0.9 % (FLUSH) 0.9 %
5-40 SYRINGE (ML) INJECTION EVERY 12 HOURS SCHEDULED
Status: DISCONTINUED | OUTPATIENT
Start: 2021-11-22 | End: 2021-11-22 | Stop reason: SDUPTHER

## 2021-11-22 RX ORDER — FAMOTIDINE 20 MG/1
20 TABLET, FILM COATED ORAL 2 TIMES DAILY
Status: DISCONTINUED | OUTPATIENT
Start: 2021-11-22 | End: 2021-11-25 | Stop reason: HOSPADM

## 2021-11-22 RX ADMIN — MAGNESIUM SULFATE HEPTAHYDRATE 2000 MG/HR: 40 INJECTION, SOLUTION INTRAVENOUS at 03:04

## 2021-11-22 RX ADMIN — NIFEDIPINE 120 MG: 30 TABLET, EXTENDED RELEASE ORAL at 04:48

## 2021-11-22 RX ADMIN — SODIUM CHLORIDE, POTASSIUM CHLORIDE, SODIUM LACTATE AND CALCIUM CHLORIDE: 600; 310; 30; 20 INJECTION, SOLUTION INTRAVENOUS at 14:04

## 2021-11-22 RX ADMIN — LABETALOL HYDROCHLORIDE 20 MG: 5 INJECTION, SOLUTION INTRAVENOUS at 02:14

## 2021-11-22 RX ADMIN — FAMOTIDINE 20 MG: 20 TABLET ORAL at 21:06

## 2021-11-22 RX ADMIN — ACETAMINOPHEN 1000 MG: 500 TABLET ORAL at 11:24

## 2021-11-22 RX ADMIN — METOCLOPRAMIDE HYDROCHLORIDE 10 MG: 5 INJECTION INTRAMUSCULAR; INTRAVENOUS at 21:06

## 2021-11-22 RX ADMIN — HYDRALAZINE HYDROCHLORIDE 10 MG: 20 INJECTION INTRAMUSCULAR; INTRAVENOUS at 03:31

## 2021-11-22 RX ADMIN — MAGNESIUM SULFATE HEPTAHYDRATE 2000 MG/HR: 40 INJECTION, SOLUTION INTRAVENOUS at 23:20

## 2021-11-22 RX ADMIN — MAGNESIUM SULFATE HEPTAHYDRATE 2000 MG/HR: 40 INJECTION, SOLUTION INTRAVENOUS at 13:14

## 2021-11-22 RX ADMIN — MAGNESIUM SULFATE HEPTAHYDRATE 4000 MG: 40 INJECTION, SOLUTION INTRAVENOUS at 02:35

## 2021-11-22 RX ADMIN — LABETALOL HYDROCHLORIDE 40 MG: 5 INJECTION, SOLUTION INTRAVENOUS at 02:31

## 2021-11-22 RX ADMIN — METOCLOPRAMIDE HYDROCHLORIDE 10 MG: 5 INJECTION INTRAMUSCULAR; INTRAVENOUS at 11:24

## 2021-11-22 RX ADMIN — ACETAMINOPHEN 1000 MG: 500 TABLET ORAL at 03:29

## 2021-11-22 RX ADMIN — LABETALOL HYDROCHLORIDE 80 MG: 5 INJECTION, SOLUTION INTRAVENOUS at 03:08

## 2021-11-22 RX ADMIN — METOCLOPRAMIDE HYDROCHLORIDE 10 MG: 5 INJECTION INTRAMUSCULAR; INTRAVENOUS at 03:29

## 2021-11-22 RX ADMIN — LORAZEPAM 2 MG: 0.5 TABLET ORAL at 03:29

## 2021-11-22 RX ADMIN — SERTRALINE HYDROCHLORIDE 25 MG: 25 TABLET ORAL at 23:20

## 2021-11-22 RX ADMIN — FAMOTIDINE 20 MG: 20 TABLET ORAL at 03:29

## 2021-11-22 ASSESSMENT — PAIN SCALES - GENERAL
PAINLEVEL_OUTOF10: 7
PAINLEVEL_OUTOF10: 8
PAINLEVEL_OUTOF10: 0
PAINLEVEL_OUTOF10: 0

## 2021-11-22 NOTE — PLAN OF CARE
Problem: Pain:  Goal: Pain level will decrease  Description: Pain level will decrease  Outcome: Ongoing  Goal: Control of acute pain  Description: Control of acute pain  Outcome: Ongoing  Goal: Control of chronic pain  Description: Control of chronic pain  Outcome: Ongoing     Problem: Fluid Volume - Imbalance:  Goal: Absence of imbalanced fluid volume signs and symptoms  Description: Absence of imbalanced fluid volume signs and symptoms  Outcome: Ongoing

## 2021-11-22 NOTE — ED TRIAGE NOTES
Labor and Delivery Triage Note        CHIEF COMPLAINT:  headache    HISTORY OF PRESENT ILLNESS:      The patient is a 27 y.o. female  Unknown. S/p vaginal delivery on 2021  OB History        3    Para        Term                AB        Living   1       SAB        IAB        Ectopic        Molar        Multiple        Live Births   1              Patient presents complaining of  Headache with BP in severe range in ED , postpartum. Delivered at [facility]. Estimated Due Date:  Estimated Date of Delivery: None noted. PAST MEDICAL HISTORY:   Past Medical History:   Diagnosis Date    Anxiety     Depression     Headache     Osteoarthritis        PAST  SURGICAL HISTORY:   Past Surgical History:   Procedure Laterality Date    ARM SURGERY Left     fx at the age 4   [facility]  SECTION  2009    CHOLECYSTECTOMY         SOCIAL HISTORY:     reports that she has never smoked. She has never used smokeless tobacco. She reports current drug use. Drug: Marijuana Simmie Lang). She reports that she does not drink alcohol. MEDICATIONS:    Prior to Admission medications    Medication Sig Start Date End Date Taking?  Authorizing Provider   acetaminophen (APAP EXTRA STRENGTH) 500 MG tablet Take 2 tablets by mouth every 6 hours as needed for Pain 21   Elaine Wallace, DO   sertraline (ZOLOFT) 25 MG tablet  3/20/21   Historical Provider, MD   Prenatal Vit-Fe Fumarate-FA (PRENATAL VITAMIN PO) Take by mouth    Historical Provider, MD   butalbital-APAP-caffeine (FIORICET) -40 MG CAPS per capsule Take 1 capsule by mouth every 4 hours as needed for Headaches 21  Jocelyn Jonas,    escitalopram (LEXAPRO) 20 MG tablet TAKE 1 TABLET BY MOUTH EVERY DAY  Patient not taking: Reported on 3/25/2021 1/26/21   SHAMIKA Berumen - CNP   SUMAtriptan (IMITREX) 100 MG tablet Take 1 tablet by mouth once as needed for Migraine 21  SHAMIKA Berumen CNP   propranolol (INDERAL LA) 60 MG extended release capsule Take 1 capsule by mouth daily  Patient not taking: Reported on 3/25/2021 1/26/21 5/19/21  SHAMIKA Resendiz CNP   naproxen (NAPROSYN) 500 MG tablet Take 1 tablet by mouth 2 times daily (with meals)  Patient not taking: Reported on 3/25/2021 4/23/20 5/19/21  SHAMIKA Resendiz CNP        FAMILY HISTORY:   Family History   Problem Relation Age of Onset    Depression Mother     High Cholesterol Maternal Grandfather     Heart Disease Maternal Grandfather     Cancer Maternal Grandfather         lung brain    Diabetes Maternal Grandfather     Diabetes Other         mothers dide    Depression Maternal Grandmother     Kidney Disease Paternal Grandfather        DRUG ALLERGIES: Lidocaine and Bee venom    PRENATAL CARE:   Complicated by: none    REVIEW OF SYSTEMS:     Pertinent items are noted in HPI. PHYSICAL EXAM:    Vital Signs: Last menstrual period 02/14/2021, unknown if currently breastfeeding. CONSTITUTIONAL:  awake, alert, cooperative, no apparent distress, and appears stated age  LUNGS:  No increased work of breathing, good air exchange, clear to auscultation bilaterally, no crackles or wheezing  CARDIOVASCULAR:  Normal apical impulse, regular rate and rhythm, normal S1 and S2, no S3 or S4, and no murmur noted  ABDOMEN:  No scars, normal bowel sounds, soft, non-distended, non-tender, no masses palpated, no hepatosplenomegally  Cervix:  Deferred      General Labs:      No visits with results within 1 Day(s) from this visit.    Latest known visit with results is:   Orders Only on 11/12/2021   Component Date Value Ref Range Status    WBC 11/12/2021 9.5  4.4 - 11.3 x10E9/L Final    Nucleated RBCs 11/12/2021 0.0  0.0 - 0.0 /100 WBC Final    RBC 11/12/2021 3.83* 4.00 - 5.2 x10E12/L Final    Hemoglobin 11/12/2021 9.6* 12.0 - 16 g/dL Final    Hematocrit 11/12/2021 32.4* 36.0 - 46 % Final    MCV 11/12/2021 85  80 - 100 fL Final    MCHC 11/12/2021 29.6* 32.0 - 36 g/dL Final    Platelets 08/10/1120 181  150 - 450 x10E9/L Final    RDW-CV 11/12/2021 15.8* 11.5 - 14 % Final    POC Glucose 11/12/2021 113* 74 - 99 mg/dL Final    SARS-CoV-2, PCR 11/12/2021 NOT DETECTED  Not Detect Final    Specimen Source 11/12/2021 Nasal, Nasopharyngeal   Final    ABO 11/12/2021 A   Final    Rh Type 11/12/2021 NEG   Final    Antibody Screen 11/12/2021 NEG   Corrected    ABO 11/12/2021 A   Final    Rh Type 11/12/2021 NEG   Final    POC Glucose 11/12/2021 101* 74 - 99 mg/dL Final    Syphilis Treponemal Ab 11/12/2021 NONREACTIVE  NONREACTIV Final    pH, Cord Art 11/12/2021 7.04* 7.08 - 7.3 Final    pCO2, Cord Art 11/12/2021 76* 31 - 75 mmHg Final    pO2, Cord Art 11/12/2021 6  5 - 31 mmHg Final    Pt Temp 11/12/2021 37.0  degrees C Final    O2 Sat, Cord Art 11/12/2021 5  3 - 69 % Final    Base Exc, Cord Art 11/12/2021 -11.3* -10.8 - 0 mmol/L Final    HCO3, Cord Art 11/12/2021 20.6  15.0 - 29 mmol/L Final    pH, Cord Ortiz 11/12/2021 7.16* 7.19 - 7.4 Final    pCO2, Cord Ortiz 11/12/2021 56* 22 - 53 mmHg Final    pO2, Cord Ortiz 11/12/2021 13  13 - 37 mmHg Final    Pt Temp 11/12/2021 37.0  degrees C Final    O2 Sat, Cord Ortiz 11/12/2021 15* 16 - 84 % Final    Base Exc, Cord Ortiz 11/12/2021 -9.2* -8.1 - 0 mmol/L Final    HCO3, Cord Ortiz 11/12/2021 20.0  16.0 - 26 mmol/L Final    POC Glucose 11/13/2021 113* 74 - 99 mg/dL Final    WBC 11/13/2021 11.2  4.4 - 11.3 x10E9/L Final    Nucleated RBCs 11/13/2021 0.0  0.0 - 0.0 /100 WBC Final    RBC 11/13/2021 2.86* 4.00 - 5.2 x10E12/L Final    Hemoglobin 11/13/2021 7.3* 12.0 - 16 g/dL Final    Hematocrit 11/13/2021 22.2* 36.0 - 46 % Final    MCV 11/13/2021 78* 80 - 100 fL Final    MCHC 11/13/2021 32.9  32.0 - 36 g/dL Final    Platelets 30/18/8824 139* 150 - 450 x10E9/L Final    RDW-CV 11/13/2021 15.9* 11.5 - 14 % Final    WBC 11/14/2021 13.3* 4.4 - 11.3 x10E9/L Final    Nucleated RBCs 11/14/2021 0.0  0.0 - 0.0 /100 WBC Final  RBC 2021 2.85* 4.00 - 5.2 x10E12/L Final    Hemoglobin 2021 7.0* 12.0 - 16 g/dL Final    Hematocrit 2021 23.8* 36.0 - 46 % Final    MCV 2021 84  80 - 100 fL Final    MCHC 2021 29.4* 32.0 - 36 g/dL Final    Platelets  144* 150 - 450 x10E9/L Final    RDW-CV 2021 16.0* 11.5 - 14 % Final    WBC 11/15/2021 13.5* 4.4 - 11.3 x10E9/L Final    Nucleated RBCs 11/15/2021 0.1  0.0 - 0.0 /100 WBC Final    RBC 11/15/2021 3.04* 4.00 - 5.2 x10E12/L Final    Hemoglobin 11/15/2021 7.9* 12.0 - 16 g/dL Final    Hematocrit 11/15/2021 26.1* 36.0 - 46 % Final    MCV 11/15/2021 86  80 - 100 fL Final    MCHC 11/15/2021 30.3* 32.0 - 36 g/dL Final    Platelets  176  150 - 450 x10E9/L Final    RDW-CV 11/15/2021 16.0* 11.5 - 14 % Final    Surgical Pathology Report 2021    Final    Comment: [facility] DepartmentName Mitzi Torres                                                                   ASSESSMENT:    The patient is a 27 y.o. female  Unknown with :     Severe postpartum pre-eclampsia. There are no active hospital problems to display for this patient.         Orders Placed This Encounter   Procedures    CBC    BUN    Creatinine, Serum    Hepatic Function Panel    Protein / creatinine ratio, urine    Uric Acid    Vital signs per unit routine    Blood pressure measurements after hypertensive agent for severe hypertension    Notify physician for   Ailene Lefort with bathroom privileges    Instruct patient to report symptoms of    Magnesium infusion management    Total IV fluid goal    Seizure precautions     Orders Placed This Encounter   Medications    lactated ringers infusion    sodium chloride flush 0.9 % injection 5-40 mL    sodium chloride flush 0.9 % injection 5-40 mL    0.9 % sodium chloride infusion    labetalol (NORMODYNE;TRANDATE) 5 MG/ML injection     Lisa Dior: cabinet override       PLAN:  Disposition:

## 2021-11-22 NOTE — FLOWSHEET NOTE
Dr Eulalia Denny updated that mag is not compatable with antibiotic ordered, okay to hold until mag is done infusing.

## 2021-11-23 PROCEDURE — 2580000003 HC RX 258: Performed by: OBSTETRICS & GYNECOLOGY

## 2021-11-23 PROCEDURE — 1220000000 HC SEMI PRIVATE OB R&B

## 2021-11-23 PROCEDURE — 6370000000 HC RX 637 (ALT 250 FOR IP): Performed by: OBSTETRICS & GYNECOLOGY

## 2021-11-23 PROCEDURE — S9443 LACTATION CLASS: HCPCS

## 2021-11-23 PROCEDURE — 6360000002 HC RX W HCPCS: Performed by: OBSTETRICS & GYNECOLOGY

## 2021-11-23 RX ADMIN — LORAZEPAM 2 MG: 0.5 TABLET ORAL at 04:06

## 2021-11-23 RX ADMIN — FAMOTIDINE 20 MG: 20 TABLET ORAL at 20:08

## 2021-11-23 RX ADMIN — SERTRALINE HYDROCHLORIDE 25 MG: 25 TABLET ORAL at 08:58

## 2021-11-23 RX ADMIN — Medication 10 ML: at 08:59

## 2021-11-23 RX ADMIN — METOCLOPRAMIDE HYDROCHLORIDE 10 MG: 5 INJECTION INTRAMUSCULAR; INTRAVENOUS at 20:08

## 2021-11-23 RX ADMIN — NIFEDIPINE 120 MG: 30 TABLET, EXTENDED RELEASE ORAL at 08:58

## 2021-11-23 RX ADMIN — FAMOTIDINE 20 MG: 20 TABLET ORAL at 08:58

## 2021-11-23 RX ADMIN — CEFTRIAXONE SODIUM 1000 MG: 1 INJECTION, POWDER, FOR SOLUTION INTRAMUSCULAR; INTRAVENOUS at 04:01

## 2021-11-23 ASSESSMENT — PAIN SCALES - GENERAL: PAINLEVEL_OUTOF10: 0

## 2021-11-23 NOTE — PLAN OF CARE
Problem: Pain:  Goal: Pain level will decrease  Description: Pain level will decrease  Outcome: Ongoing  Goal: Control of acute pain  Description: Control of acute pain  Outcome: Ongoing  Goal: Control of chronic pain  Description: Control of chronic pain  Outcome: Ongoing     Problem: Fluid Volume - Imbalance:  Goal: Absence of imbalanced fluid volume signs and symptoms  Description: Absence of imbalanced fluid volume signs and symptoms  Outcome: Ongoing     Problem: Skin Integrity:  Goal: Will show no infection signs and symptoms  Description: Will show no infection signs and symptoms  Outcome: Ongoing  Goal: Absence of new skin breakdown  Description: Absence of new skin breakdown  Outcome: Ongoing

## 2021-11-23 NOTE — FLOWSHEET NOTE
Pt assisted to the bathroom to void. Pt voided 900 cc jenna urine. Pt assisted back to chair to eat breakfast.  Denies any needs at this time.

## 2021-11-24 LAB
ALBUMIN SERPL-MCNC: 3.3 G/DL (ref 3.5–4.6)
ALP BLD-CCNC: 120 U/L (ref 40–130)
ALT SERPL-CCNC: 14 U/L (ref 0–33)
ANION GAP SERPL CALCULATED.3IONS-SCNC: 11 MEQ/L (ref 9–15)
AST SERPL-CCNC: 13 U/L (ref 0–35)
BASOPHILS ABSOLUTE: 0 K/UL (ref 0–0.2)
BASOPHILS RELATIVE PERCENT: 0.5 %
BILIRUB SERPL-MCNC: 0.4 MG/DL (ref 0.2–0.7)
BUN BLDV-MCNC: 5 MG/DL (ref 6–20)
CALCIUM SERPL-MCNC: 8.3 MG/DL (ref 8.5–9.9)
CHLORIDE BLD-SCNC: 101 MEQ/L (ref 95–107)
CO2: 27 MEQ/L (ref 20–31)
CREAT SERPL-MCNC: 0.68 MG/DL (ref 0.5–0.9)
EKG ATRIAL RATE: 114 BPM
EKG P AXIS: 36 DEGREES
EKG P-R INTERVAL: 136 MS
EKG Q-T INTERVAL: 336 MS
EKG QRS DURATION: 74 MS
EKG QTC CALCULATION (BAZETT): 463 MS
EKG R AXIS: -40 DEGREES
EKG T AXIS: 88 DEGREES
EKG VENTRICULAR RATE: 114 BPM
EOSINOPHILS ABSOLUTE: 0 K/UL (ref 0–0.7)
EOSINOPHILS RELATIVE PERCENT: 0.7 %
GFR AFRICAN AMERICAN: >60
GFR NON-AFRICAN AMERICAN: >60
GLOBULIN: 3.3 G/DL (ref 2.3–3.5)
GLUCOSE BLD-MCNC: 151 MG/DL (ref 70–99)
HCT VFR BLD CALC: 34.1 % (ref 37–47)
HEMOGLOBIN: 10.8 G/DL (ref 12–16)
LV EF: 65 %
LVEF MODALITY: NORMAL
LYMPHOCYTES ABSOLUTE: 1.7 K/UL (ref 1–4.8)
LYMPHOCYTES RELATIVE PERCENT: 23.1 %
MAGNESIUM: 2.8 MG/DL (ref 1.7–2.4)
MCH RBC QN AUTO: 25.3 PG (ref 27–31.3)
MCHC RBC AUTO-ENTMCNC: 31.7 % (ref 33–37)
MCV RBC AUTO: 79.7 FL (ref 82–100)
MONOCYTES ABSOLUTE: 0.5 K/UL (ref 0.2–0.8)
MONOCYTES RELATIVE PERCENT: 6.4 %
NEUTROPHILS ABSOLUTE: 5.1 K/UL (ref 1.4–6.5)
NEUTROPHILS RELATIVE PERCENT: 69.3 %
PDW BLD-RTO: 17.6 % (ref 11.5–14.5)
PLATELET # BLD: 356 K/UL (ref 130–400)
POTASSIUM SERPL-SCNC: 3.1 MEQ/L (ref 3.4–4.9)
PRO-BNP: 49 PG/ML
RBC # BLD: 4.28 M/UL (ref 4.2–5.4)
SODIUM BLD-SCNC: 139 MEQ/L (ref 135–144)
T4 FREE: 1.27 NG/DL (ref 0.84–1.68)
TOTAL PROTEIN: 6.6 G/DL (ref 6.3–8)
TROPONIN: <0.01 NG/ML (ref 0–0.01)
TSH REFLEX: 1.89 UIU/ML (ref 0.44–3.86)
WBC # BLD: 7.4 K/UL (ref 4.8–10.8)

## 2021-11-24 PROCEDURE — 85025 COMPLETE CBC W/AUTO DIFF WBC: CPT

## 2021-11-24 PROCEDURE — 6370000000 HC RX 637 (ALT 250 FOR IP): Performed by: OBSTETRICS & GYNECOLOGY

## 2021-11-24 PROCEDURE — 99254 IP/OBS CNSLTJ NEW/EST MOD 60: CPT | Performed by: INTERNAL MEDICINE

## 2021-11-24 PROCEDURE — 83880 ASSAY OF NATRIURETIC PEPTIDE: CPT

## 2021-11-24 PROCEDURE — 84484 ASSAY OF TROPONIN QUANT: CPT

## 2021-11-24 PROCEDURE — 84439 ASSAY OF FREE THYROXINE: CPT

## 2021-11-24 PROCEDURE — 1220000000 HC SEMI PRIVATE OB R&B

## 2021-11-24 PROCEDURE — 93010 ELECTROCARDIOGRAM REPORT: CPT | Performed by: INTERNAL MEDICINE

## 2021-11-24 PROCEDURE — 84443 ASSAY THYROID STIM HORMONE: CPT

## 2021-11-24 PROCEDURE — 6360000002 HC RX W HCPCS: Performed by: OBSTETRICS & GYNECOLOGY

## 2021-11-24 PROCEDURE — 2580000003 HC RX 258: Performed by: OBSTETRICS & GYNECOLOGY

## 2021-11-24 PROCEDURE — 80053 COMPREHEN METABOLIC PANEL: CPT

## 2021-11-24 PROCEDURE — 83735 ASSAY OF MAGNESIUM: CPT

## 2021-11-24 PROCEDURE — 93005 ELECTROCARDIOGRAM TRACING: CPT | Performed by: OBSTETRICS & GYNECOLOGY

## 2021-11-24 PROCEDURE — APPSS30 APP SPLIT SHARED TIME 16-30 MINUTES: Performed by: NURSE PRACTITIONER

## 2021-11-24 PROCEDURE — 93306 TTE W/DOPPLER COMPLETE: CPT

## 2021-11-24 RX ORDER — SODIUM CHLORIDE 9 MG/ML
INJECTION, SOLUTION INTRAVENOUS
Status: DISPENSED
Start: 2021-11-24 | End: 2021-11-24

## 2021-11-24 RX ORDER — LORAZEPAM 2 MG/ML
1 INJECTION INTRAMUSCULAR ONCE
Status: COMPLETED | OUTPATIENT
Start: 2021-11-24 | End: 2021-11-24

## 2021-11-24 RX ORDER — LIDOCAINE HYDROCHLORIDE 20 MG/ML
INJECTION, SOLUTION INTRAVENOUS
Status: DISPENSED
Start: 2021-11-24 | End: 2021-11-25

## 2021-11-24 RX ORDER — LABETALOL 200 MG/1
200 TABLET, FILM COATED ORAL ONCE
Status: COMPLETED | OUTPATIENT
Start: 2021-11-24 | End: 2021-11-24

## 2021-11-24 RX ORDER — POTASSIUM CHLORIDE 7.45 MG/ML
10 INJECTION INTRAVENOUS
Status: COMPLETED | OUTPATIENT
Start: 2021-11-24 | End: 2021-11-24

## 2021-11-24 RX ORDER — METHYLERGONOVINE MALEATE 0.2 MG/ML
INJECTION INTRAVENOUS
Status: DISPENSED
Start: 2021-11-24 | End: 2021-11-25

## 2021-11-24 RX ADMIN — POTASSIUM CHLORIDE 10 MEQ: 7.46 INJECTION, SOLUTION INTRAVENOUS at 14:04

## 2021-11-24 RX ADMIN — POTASSIUM CHLORIDE 10 MEQ: 7.46 INJECTION, SOLUTION INTRAVENOUS at 12:04

## 2021-11-24 RX ADMIN — NIFEDIPINE 120 MG: 30 TABLET, EXTENDED RELEASE ORAL at 07:53

## 2021-11-24 RX ADMIN — POTASSIUM CHLORIDE 10 MEQ: 7.46 INJECTION, SOLUTION INTRAVENOUS at 13:01

## 2021-11-24 RX ADMIN — SERTRALINE HYDROCHLORIDE 25 MG: 25 TABLET ORAL at 07:53

## 2021-11-24 RX ADMIN — POTASSIUM CHLORIDE 10 MEQ: 7.46 INJECTION, SOLUTION INTRAVENOUS at 11:02

## 2021-11-24 RX ADMIN — LORAZEPAM 2 MG: 0.5 TABLET ORAL at 00:22

## 2021-11-24 RX ADMIN — METOCLOPRAMIDE HYDROCHLORIDE 10 MG: 5 INJECTION INTRAMUSCULAR; INTRAVENOUS at 03:41

## 2021-11-24 RX ADMIN — CEFTRIAXONE SODIUM 1000 MG: 1 INJECTION, POWDER, FOR SOLUTION INTRAMUSCULAR; INTRAVENOUS at 03:41

## 2021-11-24 RX ADMIN — LORAZEPAM 1 MG: 2 INJECTION INTRAMUSCULAR; INTRAVENOUS at 08:38

## 2021-11-24 RX ADMIN — Medication 10 ML: at 08:38

## 2021-11-24 RX ADMIN — LABETALOL HYDROCHLORIDE 200 MG: 200 TABLET, FILM COATED ORAL at 10:09

## 2021-11-24 RX ADMIN — FAMOTIDINE 20 MG: 20 TABLET ORAL at 07:53

## 2021-11-24 ASSESSMENT — ENCOUNTER SYMPTOMS
COUGH: 0
RHINORRHEA: 0
NAUSEA: 0
TROUBLE SWALLOWING: 0
CONSTIPATION: 0
SHORTNESS OF BREATH: 1
BACK PAIN: 0
ABDOMINAL PAIN: 0
ABDOMINAL DISTENTION: 0
DIARRHEA: 0
VOMITING: 0
WHEEZING: 0

## 2021-11-24 NOTE — CONSULTS
Consult Note  Patient: Enmanuel Walker  Unit/Bed: 6075/7936-27  YOB: 1990  MRN: [id number]  Acct: [de-identified]   Admitting Diagnosis: Pre-eclampsia in third trimester [O14.93]  Observation and evaluation for suspected conditions not found [Z04.9]  Date:  2021  Hospital Day: 2      Chief Complaint: tachycardia/post partum pre eclampsia    History of Present Illness:  Pt with medical history significant for gestational diabetes, preeclampsia. Pt with no history of heart disease. Pt had  delivery of live infant 12 days ago. Pt was anemic postpartum and was given 1u PRBC prior to DC. Pt was admitted for post partum preeclampsia. Pt was given Mag Sulfate. Pt is on Nifedipine 120mg QD. BP is under control at this time, 116/62. Cardiology was consulted for tachycardia. Pt's HR has been 100's to 120's. RNs report that pt has been mildly anxious and has been given Ativan and even when she is sleeping, her HR remains elevated. On exam, pt reports that she does feel like her heart is racing. Pt denies chest pain. Pt also c/o shortness of breath, worse with exertion, states SOB has been going on since that last few weeks of her pregnancy. Labs significant for K 3.1 which was replaced by OB physicians, BUN 5, Cr 0.68, GFR >60, hgb 10.8, hct 34.1, plt 356, Covid negative.   EKG done today, , QTc 463      Allergies   Allergen Reactions    Lidocaine Anaphylaxis     Buvepcain (ingredient in epidural)    Bee Venom Swelling       Current Facility-Administered Medications   Medication Dose Route Frequency Provider Last Rate Last Admin    sodium chloride 0.9 % infusion             lactated ringers infusion   IntraVENous Continuous Enma Irving  mL/hr at 21 1404 New Bag at 21 1404    sodium chloride flush 0.9 % injection 5-40 mL  5-40 mL IntraVENous 2 times per day Enma Irving MD   10 mL at 21 0838    sodium chloride flush 0.9 % injection 5-40 mL  5-40 mL IntraVENous PRN Vladislav Torre MD        0.9 % sodium chloride infusion  25 mL IntraVENous PRN Vladislav Torre MD        ondansetron (ZOFRAN) injection 4 mg  4 mg IntraVENous Q6H PRN Vladislav Torre MD        calcium gluconate 10 % injection 1,000 mg  1,000 mg IntraVENous PRN Vladislav Torre MD        magnesium sulfate 4000 mg in 100 mL IVPB premix  4,000 mg IntraVENous Once Vladislav Torre MD        acetaminophen (TYLENOL) tablet 1,000 mg  1,000 mg Oral Q6H PRN Abhishek Desouza MD   1,000 mg at 21 1124    famotidine (PEPCID) tablet 20 mg  20 mg Oral BID Vladislav Torre MD   20 mg at 21 0753    metoclopramide (REGLAN) injection 10 mg  10 mg IntraVENous Q6H Abhishek Desouza MD   10 mg at 21 0341    LORazepam (ATIVAN) tablet 2 mg  2 mg Oral Q6H PRN Abhishek Desouza MD   2 mg at 21 0022    NIFEdipine (PROCARDIA XL) extended release tablet 120 mg  120 mg Oral Daily Abhishek Desouza MD   120 mg at 21 0753    cefTRIAXone (ROCEPHIN) 1000 mg IVPB in 50 mL D5W minibag  1,000 mg IntraVENous Q24H Yane White  mL/hr at 21 0341 1,000 mg at 21 0341    sertraline (ZOLOFT) tablet 25 mg  25 mg Oral Daily Yane White MD   25 mg at 21 0753       PMHx:  Past Medical History:   Diagnosis Date    Abnormal Pap smear of cervix     Anemia     with pregnancy    Anxiety     Complication of anesthesia     Depression     Diabetes mellitus ([facility] [address]     gestational diabetes    Headache     Osteoarthritis     Pre-eclampsia in third trimester 2021    Rh incompatibility        PSHx:  Past Surgical History:   Procedure Laterality Date    APPENDECTOMY      ARM SURGERY Left     fx at the age 4   [facility]  SECTION  2009    CHOLECYSTECTOMY         Social Hx:  Social History     Socioeconomic History    Marital status:      Spouse name: None    Number of children: None    Years of education: None    Highest education level: None   Occupational History    None Tobacco Use    Smoking status: Never Smoker    Smokeless tobacco: Never Used   Vaping Use    Vaping Use: Never used   Substance and Sexual Activity    Alcohol use: Not Currently    Drug use: Yes     Types: Marijuana Valeria Jan)     Comment: Migraines    Sexual activity: Yes     Partners: Male   Other Topics Concern    None   Social History Narrative    None     Social Determinants of Health     Financial Resource Strain: Medium Risk    Difficulty of Paying Living Expenses: Somewhat hard   Food Insecurity: Food Insecurity Present    Worried About Running Out of Food in the Last Year: Sometimes true    Radha of Food in the Last Year: Sometimes true   Transportation Needs: No Transportation Needs    Lack of Transportation (Medical): No    Lack of Transportation (Non-Medical):  No   Physical Activity:     Days of Exercise per Week: Not on file    Minutes of Exercise per Session: Not on file   Stress:     Feeling of Stress : Not on file   Social Connections:     Frequency of Communication with Friends and Family: Not on file    Frequency of Social Gatherings with Friends and Family: Not on file    Attends Church Services: Not on file    Active Member of [address] [place] [address] or Organizations: Not on file    Attends Club or Organization Meetings: Not on file    Marital Status: Not on file   Intimate Partner Violence:     Fear of Current or Ex-Partner: Not on file    Emotionally Abused: Not on file    Physically Abused: Not on file    Sexually Abused: Not on file   Housing Stability:     Unable to Pay for Housing in the Last Year: Not on file    Number of [place] in the Last Year: Not on file    Unstable Housing in the Last Year: Not on file       Family Hx:  Family History   Problem Relation Age of Onset    Depression Mother     High Cholesterol Maternal Grandfather     Heart Disease Maternal Grandfather     Cancer Maternal Grandfather         lung brain    Diabetes Maternal Grandfather     Diabetes Other         mothers dide    Depression Maternal Grandmother     Kidney Disease Paternal Grandfather        Review of Systems:   Review of Systems   Constitutional: Negative for chills, diaphoresis and fever. HENT: Negative for congestion, rhinorrhea and trouble swallowing. Eyes: Negative for visual disturbance. Respiratory: Positive for shortness of breath. Negative for cough and wheezing. Cardiovascular: Positive for palpitations. Negative for chest pain and leg swelling. Gastrointestinal: Negative for abdominal distention, abdominal pain, constipation, diarrhea, nausea and vomiting. Endocrine: Negative. Genitourinary: Negative for difficulty urinating, dysuria, frequency and urgency. Musculoskeletal: Negative for back pain and gait problem. Skin: Negative for wound. Neurological: Negative for dizziness, seizures, syncope, speech difficulty, weakness, numbness and headaches. Hematological: Does not bruise/bleed easily. Psychiatric/Behavioral: Negative. Physical Examination:    /62   Pulse 117   Temp 98.4 °F (36.9 °C) (Oral)   Resp 17   LMP 02/14/2021   SpO2 98%    Physical Exam  Vitals and nursing note reviewed. Constitutional:       General: She is not in acute distress. HENT:      Head: Normocephalic. Nose: Nose normal.      Mouth/Throat:      Mouth: Mucous membranes are moist.   Eyes:      Pupils: Pupils are equal, round, and reactive to light. Neck:      Vascular: No carotid bruit. Cardiovascular:      Rate and Rhythm: Regular rhythm. Tachycardia present. Pulses: Normal pulses. Heart sounds: Normal heart sounds. No murmur heard. No friction rub. No gallop. Pulmonary:      Effort: Pulmonary effort is normal. No respiratory distress. Breath sounds: Normal breath sounds. No stridor. No wheezing, rhonchi or rales. Chest:      Chest wall: No tenderness. Abdominal:      General: Abdomen is flat.       Palpations: Abdomen is soft.   Musculoskeletal:         General: Normal range of motion. Cervical back: Normal range of motion. Right lower leg: No edema. Left lower leg: No edema. Skin:     General: Skin is warm and dry. Neurological:      General: No focal deficit present. Mental Status: She is alert and oriented to person, place, and time.    Psychiatric:         Mood and Affect: Mood normal.         Behavior: Behavior normal.         LABS:  CBC:  Lab Results   Component Value Date    WBC 7.4 11/24/2021    RBC 4.28 11/24/2021    HGB 10.8 11/24/2021    HCT 34.1 11/24/2021    MCV 79.7 11/24/2021    MCH 25.3 11/24/2021    MCHC 31.7 11/24/2021    RDW 17.6 11/24/2021     11/24/2021    MPV 9.7 07/30/2015     CBC with Differential:   Lab Results   Component Value Date    WBC 7.4 11/24/2021    RBC 4.28 11/24/2021    HGB 10.8 11/24/2021    HCT 34.1 11/24/2021     11/24/2021    MCV 79.7 11/24/2021    MCH 25.3 11/24/2021    MCHC 31.7 11/24/2021    RDW 17.6 11/24/2021    NRBC 0.1 11/15/2021    LYMPHOPCT 23.1 11/24/2021    MONOPCT 6.4 11/24/2021    BASOPCT 0.5 11/24/2021    MONOSABS 0.5 11/24/2021    LYMPHSABS 1.7 11/24/2021    EOSABS 0.0 11/24/2021    BASOSABS 0.0 11/24/2021     CMP:    Lab Results   Component Value Date     11/24/2021    K 3.1 11/24/2021     11/24/2021    CO2 27 11/24/2021    BUN 5 11/24/2021    CREATININE 0.68 11/24/2021    GFRAA >60.0 11/24/2021    LABGLOM >60.0 11/24/2021    GLUCOSE 151 11/24/2021    PROT 6.6 11/24/2021    LABALBU 3.3 11/24/2021    CALCIUM 8.3 11/24/2021    BILITOT 0.4 11/24/2021    ALKPHOS 120 11/24/2021    AST 13 11/24/2021    ALT 14 11/24/2021     BMP:    Lab Results   Component Value Date     11/24/2021    K 3.1 11/24/2021     11/24/2021    CO2 27 11/24/2021    BUN 5 11/24/2021    LABALBU 3.3 11/24/2021    CREATININE 0.68 11/24/2021    CALCIUM 8.3 11/24/2021    GFRAA >60.0 11/24/2021    LABGLOM >60.0 11/24/2021    GLUCOSE 151 11/24/2021 Magnesium:  No results found for: MG  Troponin:  No results found for: [address]    Radiology:  No results found. EKG: Sinus tachycardia  Left axis deviation  Possible Anterior infarct , age undetermined  Abnormal ECG  No previous ECGs available      Assessment:    Active Hospital Problems    Diagnosis Date Noted    Pre-eclampsia in third trimester [O14.93] 11/22/2021     Priority: Low    Observation and evaluation for suspected conditions not found [Z04.9] 11/22/2021     Priority: Low   Sinus tachycadia       Plan:  1. Check ekg  2. Check 2D Echo for LV function, PA pressures, wall motion abnormalities and any significant valvular disease. 3. Check BNP, Troponin, Mag  4. Telemetry monitoring      Electronically signed by SHAMIKA Enriquez CNP on 11/24/2021 at 3:46 PM      Attending Supervising [de-identified] Attestation Statement  The patient is a 27 y.o. female. I have performed a history and physical examination of the patient. I discussed the case with the nurse practitioner. I reviewed the patient's Past Medical History, Past Surgical History, Medications, and Allergies.      Physical Exam:  Vitals:    11/24/21 1108 11/24/21 1200 11/24/21 1400 11/24/21 1645   BP:  110/60 116/62 123/71   Pulse: 126 104 117 120   Resp:       Temp:       TempSrc:       SpO2:  98% 98%        Review of Systems - Respiratory ROS: positive for - shortness of breath  Cardiovascular ROS: positive for - dyspnea on exertion  Gastrointestinal ROS: no abdominal pain, change in bowel habits, or black or bloody stools    Pulmonary/Chest: clear to auscultation bilaterally- no wheezes, rales or rhonchi, normal air movement, no respiratory distress  Cardiovascular: normal rate, normal S1 and S2, no gallops, intact distal pulses and no carotid bruits  Abdomen: soft, non-tender, non-distended, normal bowel sounds, no masses or organomegaly    Active Hospital Problems    Diagnosis Date Noted    Pre-eclampsia in third trimester [O14.93] 11/22/2021     Priority: Low    Observation and evaluation for suspected conditions not found [Z04.9] 11/22/2021     Priority: Low        I reviewed and agree with the findings and plan documented in her note . Impression    Pre eclampsia  Sinus tach- likely physiologic(anemia)/anxiety related- doubt thromboembolic disease  Hypokalemia. Morbid pbesity    Plan     1. Check ECHO  2. Replace K+  3. Monitor on tele  4. Cont with procardia XL  5. Add Labetalol if needed   6.  Mg>2       Electronically signed by Nam Schultz DO on 11/24/21 at 5:39 PM EST

## 2021-11-24 NOTE — PLAN OF CARE
Problem: Pain:  Goal: Pain level will decrease  Description: Pain level will decrease  11/23/2021 2015 by Allison Prasad RN  Outcome: Ongoing  11/23/2021 1827 by Prasanth Chirinos RN  Outcome: Ongoing  Goal: Control of acute pain  Description: Control of acute pain  11/23/2021 2015 by Allison Prasad RN  Outcome: Ongoing  11/23/2021 1827 by Prasanth Chirinos RN  Outcome: Ongoing  Goal: Control of chronic pain  Description: Control of chronic pain  11/23/2021 2015 by Allison Prasad RN  Outcome: Ongoing  11/23/2021 1827 by Prasanth Chirinos RN  Outcome: Ongoing     Problem: Fluid Volume - Imbalance:  Goal: Absence of imbalanced fluid volume signs and symptoms  Description: Absence of imbalanced fluid volume signs and symptoms  11/23/2021 2015 by Allison Prasad RN  Outcome: Ongoing  11/23/2021 1827 by Prasanth Chirinos RN  Outcome: Ongoing     Problem: Skin Integrity:  Goal: Will show no infection signs and symptoms  Description: Will show no infection signs and symptoms  11/23/2021 2015 by Allison Prasad RN  Outcome: Ongoing  11/23/2021 1827 by Prasanth Chirinos RN  Outcome: Ongoing  Goal: Absence of new skin breakdown  Description: Absence of new skin breakdown  11/23/2021 2015 by Allison Prasad RN  Outcome: Ongoing  11/23/2021 1827 by Prasanth Chirinos RN  Outcome: Ongoing

## 2021-11-24 NOTE — FLOWSHEET NOTE
Dr salgado in to see pt. Pt sitting in chair, pumping. Pt states she gets a little short of breath when up and around but it has been that way since the end of the pregnancy. Color pink. Denies any other c/o. Voiding.

## 2021-11-24 NOTE — PLAN OF CARE
Problem: Pain:  Goal: Pain level will decrease  Description: Pain level will decrease  11/24/2021 0832 by Sammy Ortiz RN  Outcome: Ongoing  11/23/2021 2015 by Denae Haley RN  Outcome: Ongoing  Goal: Control of acute pain  Description: Control of acute pain  11/24/2021 0832 by Sammy Ortiz RN  Outcome: Ongoing  11/23/2021 2015 by Denae Haley RN  Outcome: Ongoing  Goal: Control of chronic pain  Description: Control of chronic pain  11/24/2021 0832 by Sammy Ortiz RN  Outcome: Ongoing  11/23/2021 2015 by Denae Haley RN  Outcome: Ongoing     Problem: Fluid Volume - Imbalance:  Goal: Absence of imbalanced fluid volume signs and symptoms  Description: Absence of imbalanced fluid volume signs and symptoms  11/24/2021 0832 by Sammy Ortiz RN  Outcome: Ongoing  11/23/2021 2015 by Denae Haley RN  Outcome: Ongoing     Problem: Skin Integrity:  Goal: Will show no infection signs and symptoms  Description: Will show no infection signs and symptoms  11/24/2021 0832 by Sammy Ortiz RN  Outcome: Ongoing  11/23/2021 2015 by Denae Haley RN  Outcome: Ongoing  Goal: Absence of new skin breakdown  Description: Absence of new skin breakdown  11/24/2021 0832 by Sammy Ortiz RN  Outcome: Ongoing  11/23/2021 2015 by Denae Haley RN  Outcome: Ongoing

## 2021-11-24 NOTE — PROGRESS NOTES
Notified Dr Jt Coleman of pulse rate again. Hospilalist consult placed. Notified Dr Gabriela Seay of consult and consult defered to Cardiology.  notifying Dr Oracio Calzada of consult.

## 2021-11-25 ENCOUNTER — APPOINTMENT (OUTPATIENT)
Dept: GENERAL RADIOLOGY | Age: 31
DRG: 776 | End: 2021-11-25
Payer: COMMERCIAL

## 2021-11-25 VITALS
TEMPERATURE: 98 F | HEART RATE: 116 BPM | OXYGEN SATURATION: 98 % | RESPIRATION RATE: 16 BRPM | SYSTOLIC BLOOD PRESSURE: 135 MMHG | DIASTOLIC BLOOD PRESSURE: 75 MMHG

## 2021-11-25 PROCEDURE — 2580000003 HC RX 258: Performed by: OBSTETRICS & GYNECOLOGY

## 2021-11-25 PROCEDURE — 99238 HOSP IP/OBS DSCHRG MGMT 30/<: CPT | Performed by: OBSTETRICS & GYNECOLOGY

## 2021-11-25 PROCEDURE — 6360000002 HC RX W HCPCS: Performed by: OBSTETRICS & GYNECOLOGY

## 2021-11-25 PROCEDURE — 6370000000 HC RX 637 (ALT 250 FOR IP): Performed by: OBSTETRICS & GYNECOLOGY

## 2021-11-25 PROCEDURE — 99233 SBSQ HOSP IP/OBS HIGH 50: CPT | Performed by: INTERNAL MEDICINE

## 2021-11-25 PROCEDURE — 71045 X-RAY EXAM CHEST 1 VIEW: CPT

## 2021-11-25 RX ORDER — NIFEDIPINE 30 MG/1
120 TABLET, EXTENDED RELEASE ORAL DAILY
Qty: 30 TABLET | Refills: 3 | Status: SHIPPED | OUTPATIENT
Start: 2021-11-26

## 2021-11-25 RX ADMIN — NIFEDIPINE 120 MG: 30 TABLET, EXTENDED RELEASE ORAL at 08:29

## 2021-11-25 RX ADMIN — METOCLOPRAMIDE HYDROCHLORIDE 10 MG: 5 INJECTION INTRAMUSCULAR; INTRAVENOUS at 08:30

## 2021-11-25 RX ADMIN — SERTRALINE HYDROCHLORIDE 25 MG: 25 TABLET ORAL at 08:30

## 2021-11-25 RX ADMIN — CEFTRIAXONE SODIUM 1000 MG: 1 INJECTION, POWDER, FOR SOLUTION INTRAMUSCULAR; INTRAVENOUS at 05:44

## 2021-11-25 RX ADMIN — FAMOTIDINE 20 MG: 20 TABLET ORAL at 08:29

## 2021-11-25 ASSESSMENT — ENCOUNTER SYMPTOMS
WHEEZING: 0
CONSTIPATION: 0
TROUBLE SWALLOWING: 0
ABDOMINAL DISTENTION: 0
DIARRHEA: 0
COUGH: 0
NAUSEA: 0
BACK PAIN: 0
ABDOMINAL PAIN: 0
SHORTNESS OF BREATH: 1
VOMITING: 0
RHINORRHEA: 0

## 2021-11-25 NOTE — FLOWSHEET NOTE
Spoke to cardiology about the patient's discharge status. He will be here to assess the patient for discharge.

## 2021-11-25 NOTE — PROGRESS NOTES
Pt seen and examined at bedside. She denies HA, visual changes, epigastric pain. She does admit to some SOB.      VSS  Chest: CTAB      A/P: 26 yo s/p CD 11/12   Post partum preeclampsia  S/p magnesium sulfate prophylaxis  ECHO results pending  Will order CXR

## 2021-11-25 NOTE — PROGRESS NOTES
Consult Note  Patient: Alexus Jordan  Unit/Bed: 7111/3264-02  YOB: 1990  MRN: [id number]  Acct: [de-identified]   Admitting Diagnosis: Pre-eclampsia in third trimester [O14.93]  Observation and evaluation for suspected conditions not found [Z04.9]  Date:  11/22/2021  Hospital Day: 3      Chief Complaint:   tachycardia/post partum pre eclampsia    Subjective:  Patient sitting in bed comfortably denies chest pain or shortness of breath  She is ready to go home wants to see her baby  11/25/2021 echocardiogram reviewed  EF 65% with no major valvular abnormalities      Allergies   Allergen Reactions    Lidocaine Anaphylaxis     Buvepcain (ingredient in epidural)    Bee Venom Swelling       Current Facility-Administered Medications   Medication Dose Route Frequency Provider Last Rate Last Admin    lactated ringers infusion   IntraVENous Continuous Larry Sawant  mL/hr at 11/22/21 1404 New Bag at 11/22/21 1404    sodium chloride flush 0.9 % injection 5-40 mL  5-40 mL IntraVENous 2 times per day Larry Sawant MD   10 mL at 11/24/21 0838    sodium chloride flush 0.9 % injection 5-40 mL  5-40 mL IntraVENous PRN Abhishek Desouza MD        0.9 % sodium chloride infusion  25 mL IntraVENous PRN Larry Sawant MD        ondansetron (ZOFRAN) injection 4 mg  4 mg IntraVENous Q6H PRN Larry Sawant MD        calcium gluconate 10 % injection 1,000 mg  1,000 mg IntraVENous PRN Larry Sawant MD        magnesium sulfate 4000 mg in 100 mL IVPB premix  4,000 mg IntraVENous Once Larry Sawant MD        acetaminophen (TYLENOL) tablet 1,000 mg  1,000 mg Oral Q6H PRN Abhishek Desouza MD   1,000 mg at 11/22/21 1124    famotidine (PEPCID) tablet 20 mg  20 mg Oral BID Abhishek Desouza MD   20 mg at 11/25/21 0829    metoclopramide (REGLAN) injection 10 mg  10 mg IntraVENous Q6H Abhishek Desouza MD   10 mg at 11/25/21 0830    LORazepam (ATIVAN) tablet 2 mg  2 mg Oral Q6H PRN Larry Sawant MD   2 mg at 11/24/21 0022    NIFEdipine (PROCARDIA XL) extended release tablet 120 mg  120 mg Oral Daily Abhishek Desouza MD   120 mg at 21 0829    cefTRIAXone (ROCEPHIN) 1000 mg IVPB in 50 mL D5W minibag  1,000 mg IntraVENous Q24H Yane Cochran  mL/hr at 21 0544 1,000 mg at 21 0544    sertraline (ZOLOFT) tablet 25 mg  25 mg Oral Daily Yane Cochran MD   25 mg at 21 0830       PMHx:  Past Medical History:   Diagnosis Date    Abnormal Pap smear of cervix     Anemia     with pregnancy    Anxiety     Complication of anesthesia     Depression     Diabetes mellitus ([facility] [address]     gestational diabetes    Headache     Osteoarthritis     Pre-eclampsia in third trimester 2021    Rh incompatibility        PSHx:  Past Surgical History:   Procedure Laterality Date    APPENDECTOMY      ARM SURGERY Left     fx at the age 4   [facility]  SECTION  2009    CHOLECYSTECTOMY         Social Hx:  Social History     Socioeconomic History    Marital status:      Spouse name: None    Number of children: None    Years of education: None    Highest education level: None   Occupational History    None   Tobacco Use    Smoking status: Never Smoker    Smokeless tobacco: Never Used   Vaping Use    Vaping Use: Never used   Substance and Sexual Activity    Alcohol use: Not Currently    Drug use: Yes     Types: Marijuana Bautista Lidia)     Comment: Migraines    Sexual activity: Yes     Partners: Male   Other Topics Concern    None   Social History Narrative    None     Social Determinants of Health     Financial Resource Strain: Medium Risk    Difficulty of Paying Living Expenses: Somewhat hard   Food Insecurity: Food Insecurity Present    Worried About Running Out of Food in the Last Year: Sometimes true    Radha of Food in the Last Year: Sometimes true   Transportation Needs: No Transportation Needs    Lack of Transportation (Medical): No    Lack of Transportation (Non-Medical):  No   Physical Activity:     Days of Exercise per Week: Not on file    Minutes of Exercise per Session: Not on file   Stress:     Feeling of Stress : Not on file   Social Connections:     Frequency of Communication with Friends and Family: Not on file    Frequency of Social Gatherings with Friends and Family: Not on file    Attends [employer] Services: Not on file    Active Member of [address] [place] [address] [employer] or Organizations: Not on file    Attends Club or Organization Meetings: Not on file    Marital Status: Not on file   Intimate Partner Violence:     Fear of Current or Ex-Partner: Not on file    Emotionally Abused: Not on file    Physically Abused: Not on file    Sexually Abused: Not on file   Housing Stability:     Unable to Pay for Housing in the Last Year: Not on file    Number of [place] in the Last Year: Not on file    Unstable Housing in the Last Year: Not on file       Family Hx:  Family History   Problem Relation Age of Onset    Depression Mother     High Cholesterol Maternal Grandfather     Heart Disease Maternal Grandfather     Cancer Maternal Grandfather         lung brain    Diabetes Maternal Grandfather     Diabetes Other         mothers dide    Depression Maternal Grandmother     Kidney Disease Paternal Grandfather        Review of Systems:   Review of Systems   Constitutional: Negative for chills, diaphoresis and fever. HENT: Negative for congestion, rhinorrhea and trouble swallowing. Eyes: Negative for visual disturbance. Respiratory: Positive for shortness of breath. Negative for cough and wheezing. Cardiovascular: Positive for palpitations. Negative for chest pain and leg swelling. Gastrointestinal: Negative for abdominal distention, abdominal pain, constipation, diarrhea, nausea and vomiting. Endocrine: Negative. Genitourinary: Negative for difficulty urinating, dysuria, frequency and urgency. Musculoskeletal: Negative for back pain and gait problem. Skin: Negative for wound.    Neurological: HGB 10.8 11/24/2021    HCT 34.1 11/24/2021     11/24/2021    MCV 79.7 11/24/2021    MCH 25.3 11/24/2021    MCHC 31.7 11/24/2021    RDW 17.6 11/24/2021    NRBC 0.1 11/15/2021    LYMPHOPCT 23.1 11/24/2021    MONOPCT 6.4 11/24/2021    BASOPCT 0.5 11/24/2021    MONOSABS 0.5 11/24/2021    LYMPHSABS 1.7 11/24/2021    EOSABS 0.0 11/24/2021    BASOSABS 0.0 11/24/2021     CMP:    Lab Results   Component Value Date     11/24/2021    K 3.1 11/24/2021     11/24/2021    CO2 27 11/24/2021    BUN 5 11/24/2021    CREATININE 0.68 11/24/2021    GFRAA >60.0 11/24/2021    LABGLOM >60.0 11/24/2021    GLUCOSE 151 11/24/2021    PROT 6.6 11/24/2021    LABALBU 3.3 11/24/2021    CALCIUM 8.3 11/24/2021    BILITOT 0.4 11/24/2021    ALKPHOS 120 11/24/2021    AST 13 11/24/2021    ALT 14 11/24/2021     BMP:    Lab Results   Component Value Date     11/24/2021    K 3.1 11/24/2021     11/24/2021    CO2 27 11/24/2021    BUN 5 11/24/2021    LABALBU 3.3 11/24/2021    CREATININE 0.68 11/24/2021    CALCIUM 8.3 11/24/2021    GFRAA >60.0 11/24/2021    LABGLOM >60.0 11/24/2021    GLUCOSE 151 11/24/2021     Magnesium:    Lab Results   Component Value Date    MG 2.8 11/24/2021     Troponin:    Lab Results   Component Value Date    TROPONINI <0.010 11/24/2021       Radiology:  No results found.      EKG: Sinus tachycardia  Left axis deviation  Possible Anterior infarct , age undetermined  Abnormal ECG  No previous ECGs available      Assessment:    Active Hospital Problems    Diagnosis Date Noted    Sinus tachycardia [R00.0]      Priority: High    Pre-eclampsia in third trimester [O14.93] 11/22/2021    Observation and evaluation for suspected conditions not found [Z04.9] 11/22/2021   Sinus tachycadia       Plan:    11/25/2021 echocardiogram reviewed  EF 65% with no major valvular abnormalities  Continue Procardia  From cardiology standpoint patient can be discharged home    Electronically signed by Addison Quintanilla MD on 11/25/2021 at 1:59 PM

## 2021-11-25 NOTE — FLOWSHEET NOTE
Discharge instructions given to the patient including postbirth warning signs. Patient verbalized understanding and has no further questions or concerns.

## 2021-11-25 NOTE — FLOWSHEET NOTE
Patient denies blurred vision, epigastric pain or N/V. Patient states she does have a slight headache currently that just started. Patient was given her Procardia PO now.

## 2021-11-25 NOTE — PLAN OF CARE
Problem: Pain:  Description: Pain management should include both nonpharmacologic and pharmacologic interventions.   Goal: Pain level will decrease  Description: Pain level will decrease  11/25/2021 0843 by Stefania Chacon RN  Outcome: Ongoing  11/25/2021 0056 by Amelia Harris RN  Outcome: Ongoing  Goal: Control of acute pain  Description: Control of acute pain  11/25/2021 0843 by Stefania Chacon RN  Outcome: Ongoing  11/25/2021 0056 by Amelia Harris RN  Outcome: Ongoing  Goal: Control of chronic pain  Description: Control of chronic pain  11/25/2021 0843 by Stefania Chacon RN  Outcome: Ongoing  11/25/2021 0056 by Amelia Harris RN  Outcome: Ongoing     Problem: Fluid Volume - Imbalance:  Goal: Absence of imbalanced fluid volume signs and symptoms  Description: Absence of imbalanced fluid volume signs and symptoms  11/25/2021 0843 by Stefania Chacon RN  Outcome: Ongoing  11/25/2021 0056 by Amelia Harris RN  Outcome: Ongoing     Problem: Skin Integrity:  Goal: Will show no infection signs and symptoms  Description: Will show no infection signs and symptoms  11/25/2021 0843 by Stefania Chacon RN  Outcome: Ongoing  11/25/2021 0056 by Amelia Harris RN  Outcome: Ongoing  Goal: Absence of new skin breakdown  Description: Absence of new skin breakdown  11/25/2021 0843 by Stefania Chacon RN  Outcome: Ongoing  11/25/2021 0056 by Amelia Harris RN  Outcome: Ongoing

## 2021-11-25 NOTE — DISCHARGE SUMMARY
Discharge Summary    Date: 11/25/2021  Patient Name: Argenis Romo YOB: 1990 Age: 27 y.o. Admit Date: 11/22/2021  Discharge Date: 11/25/2021  Discharge Condition: Stable    Admission Diagnosis  Pre-eclampsia in third trimester (O14.93); Observation and evaluation for suspected conditions not found (Z04.9)     Discharge Diagnosis  Active Problems: Sinus tachycardia Pre-eclampsia in third trimester Observation and evaluation for suspected conditions not foundResolved Problems: * No resolved hospital problems. [facility] Stay  Narrative of Hospital Course:      Consultants:  IP CONSULT TO CARDIOLOGY    Surgeries/procedures Performed:       Treatments:   Cardiac Medications    Calcium Channel Blocker    Discharge Plan/Disposition:  Home    Hospital/Incidental Findings Requiring Follow Up:    Patient Instructions:    Diet: Regular Diet    Activity:Activity as Tolerated  For number of days (if applicable): Other Instructions: CXR WNL  Pt cleared by cardiology  F/u with ob/gyn in 1 week    Provider Follow-Up:   No follow-ups on file.      Significant Diagnostic Studies:    Recent Labs:  Admission on 11/22/2021WBC                                           Date: 11/22/2021Value: 9.8         Ref range: 4.8 - 10.8 K/uL    Status: FinalRBC                                           Date: 11/22/2021Value: 4.14*       Ref range: 4.20 - 5.40 M/uL   Status: FinalHemoglobin                                    Date: 11/22/2021Value: 10.4*       Ref range: 12.0 - 16.0 g/dL   Status: FinalHematocrit                                    Date: 11/22/2021Value: 33.1*       Ref range: 37.0 - 47.0 %      Status: FinalMCV                                           Date: 11/22/2021Value: 80.0*       Ref range: 82.0 - 100.0 fL    Status: [address]                                           Date: 11/22/2021Value: 25.1*       Ref range: 27.0 - 31.3 pg     Status: [address]                                          Date: 11/22/2021Value: 31.4*       Ref range: 33.0 - 37.0 %      Status: FinalRDW                                           Date: 11/22/2021Value: 17.3*       Ref range: 11.5 - 14.5 %      Status: FinalPlatelets                                     Date: 11/22/2021Value: 328         Ref range: 130 - 400 K/uL     Status: FinalBUN                                           Date: 11/22/2021Value: 8           Ref range: 6 - 20 mg/dL       Status: FinalCREATININE                                    Date: 11/22/2021Value: 0.64        Ref range: 0.50 - 0.90 mg/dL  Status: FinalGFR Non-                      Date: 11/22/2021Value: >60.0       Ref range: >60                Status: Final              Comment: >60 mL/min/1.73m2 EGFR, calc. for ages 25 and older using theMDRD formula (not corrected for weight), is valid for stablerenal function. GFR                           Date: 11/22/2021Value: >60.0       Ref range: >60                Status: Final              Comment: >60 mL/min/1.73m2 EGFR, calc. for ages 25 and older using theMDRD formula (not corrected for weight), is valid for stablerenal function. Total Protein                                 Date: 11/22/2021Value: 7.0         Ref range: 6.3 - 8.0 g/dL     Status: FinalAlbumin                                       Date: 11/22/2021Value: 3.7         Ref range: 3.5 - 4.6 g/dL     Status: FinalAlkaline Phosphatase                          Date: 11/22/2021Value: 126         Ref range: 40 - 130 U/L       Status: FinalALT                                           Date: 11/22/2021Value: 28          Ref range: 0 - 33 U/L         Status: FinalAST                                           Date: 11/22/2021Value: 25          Ref range: 0 - 35 U/L         Status: Final              Comment: Specimen hemolysis has exceeded the interference as definedby Roche. Value may be falsely increased. Suggestrecollection if clinically indicated. Total Bilirubin 11/22/2021Value: 0.2         Ref range: <2.0 E.U./dL       Status: FinalNitrite, Urine                                Date: 11/22/2021Value: Negative    Ref range: Negative           Status: FinalLeukocyte Esterase, Urine                     Date: 11/22/2021Value: MODERATE*   Ref range: Negative           Status: FinalAmphetamine Screen, Urine                     Date: 11/22/2021Value: Neg         Ref range: Negative <1000 n*  Status: FinalBarbiturate Screen, Ur                        Date: 11/22/2021Value: Neg         Ref range: Negative < 200 n*  Status: FinalBenzodiazepine Screen, Urine                  Date: 11/22/2021Value: Neg         Ref range: Negative < 200 n*  Status: FinalCannabinoid Scrn, Ur                          Date: 11/22/2021Value: Neg         Ref range: Negative < 50 ng*  Status: FinalCocaine Metabolite Screen, Urine              Date: 11/22/2021Value: Neg         Ref range: Negative < 300 n*  Status: FinalOpiate Scrn, Ur                               Date: 11/22/2021Value: Neg         Ref range: Negative < 300 n*  Status: FinalPCP Screen, Urine                             Date: 11/22/2021Value: Neg         Ref range: Negative < 25 ng*  Status: FinalMethadone Screen, Urine                       Date: 11/22/2021Value: Neg         Ref range: Negative <300 ng*  Status: FinalPropoxyphene Scrn, Ur                         Date: 11/22/2021Value: Neg         Ref range: Negative <300 ng*  Status: FinalOxycodone Urine                               Date: 11/22/2021Value: Neg         Ref range: Negative <100 ng*  Status: FinalDrug Screen Comment:                          Date: 11/22/2021Value: see below     Status: Final              Comment: This method is a screening test to detect only these drugclasses as part of a medical workup. Confirmatory testingby another method should be ordered if clinically indicated. SARS-CoV-2, NAAT                              Date: 11/22/2021Value: Not Detected Ref range: Not Detected       Status: Final              Comment: Rapid NAAT:   Negative results should be treated as presumptive and,if inconsistent with clinical signs and symptoms or necessary forpatient management, should be tested with an alternative molecularassay. Negative results do not preclude SARS-CoV-2 infection andshould not be used as the sole basis for patient management decisions. This test has been authorized by the FDA under an Emergency UseAuthorization (EUA) for use by authorized laboratories. Fact sheet for [email or web address] sheet for Patients: [email or web address]: Isothermal Nucleic Acid AmplificationBacteria, UA                                  Date: 11/22/2021Value: Negative    Ref range: Negative /HPF      Status: FinalHyaline Casts, UA                             Date: 11/22/2021Value: 1-3         Ref range: 0 - 5 /HPF         Status: [address] [facility], UA                                       Date: 11/22/2021Value: 10-20*      Ref range: 0 - 5 /HPF         Status: FinalRBC, UA                                       Date: 11/22/2021Value: *     Ref range: 0 - 5 /HPF         Status: FinalEpithelial Cells, UA                          Date: 11/22/2021Value: 0-2         Ref range: 0 - 5 /HPF         Status: [address] [place] [address]                                           Date: 11/24/2021Value: 7.4         Ref range: 4.8 - 10.8 K/uL    Status: FinalRBC                                           Date: 11/24/2021Value: 4.28        Ref range: 4.20 - 5.40 M/uL   Status: FinalHemoglobin                                    Date: 11/24/2021Value: 10.8*       Ref range: 12.0 - 16.0 g/dL   Status: FinalHematocrit                                    Date: 11/24/2021Value: 34.1*       Ref range: 37.0 - 47.0 %      Status: FinalMCV                                           Date: 11/24/2021Value: 79.7*       Ref range: 82.0 - 100.0 fL    Status: range: 3.4 - 4.9 mEq/L    Status: FinalChloride                                      Date: 11/24/2021Value: 101         Ref range: 95 - 107 mEq/L     Status: FinalCO2                                           Date: 11/24/2021Value: 27          Ref range: 20 - 31 mEq/L      Status: FinalAnion Gap                                     Date: 11/24/2021Value: 11          Ref range: 9 - 15 mEq/L       Status: FinalGlucose                                       Date: 11/24/2021Value: 151*        Ref range: 70 - 99 mg/dL      Status: FinalBUN                                           Date: 11/24/2021Value: 5*          Ref range: 6 - 20 mg/dL       Status: FinalCREATININE                                    Date: 11/24/2021Value: 0.68        Ref range: 0.50 - 0.90 mg/dL  Status: FinalGFR Non-                      Date: 11/24/2021Value: >60.0       Ref range: >60                Status: Final              Comment: >60 mL/min/1.73m2 EGFR, calc. for ages 25 and older using theMDRD formula (not corrected for weight), is valid for stablerenal function. GFR                           Date: 11/24/2021Value: >60.0       Ref range: >60                Status: Final              Comment: >60 mL/min/1.73m2 EGFR, calc. for ages 25 and older using theMDRD formula (not corrected for weight), is valid for stablerenal function. Calcium                                       Date: 11/24/2021Value: 8.3*        Ref range: 8.5 - 9.9 mg/dL    Status: FinalTotal Protein                                 Date: 11/24/2021Value: 6.6         Ref range: 6.3 - 8.0 g/dL     Status: FinalAlbumin                                       Date: 11/24/2021Value: 3.3*        Ref range: 3.5 - 4.6 g/dL     Status: FinalTotal Bilirubin                               Date: 11/24/2021Value: 0.4         Ref range: 0.2 - 0.7 mg/dL    Status: FinalAlkaline Phosphatase                          Date: 11/24/2021Value: 120         Ref range: 40 - 130 U/L Status: FinalALT                                           Date: 11/24/2021Value: 14          Ref range: 0 - 33 U/L         Status: FinalAST                                           Date: 11/24/2021Value: 13          Ref range: 0 - 35 U/L         Status: FinalGlobulin                                      Date: 11/24/2021Value: 3.3         Ref range: 2.3 - 3.5 g/dL     Status: [address]                                           Date: 11/24/2021Value: 1.890       Ref range: 0.440 - 3.860 uI*  Status: FinalT4 Free                                       Date: 11/24/2021Value: 1.27        Ref range: 0.84 - 1.68 ng/dL  Status: FinalVentricular Rate                              Date: 11/24/2021Value: 114         Ref range: BPM                Status: FinalAtrial Rate                                   Date: 11/24/2021Value: 114         Ref range: BPM                Status: FinalP-R Interval                                  Date: 11/24/2021Value: 136         Ref range: ms                 Status: FinalQRS Duration                                  Date: 11/24/2021Value: 74          Ref range: ms                 Status: FinalQ-T Interval                                  Date: 11/24/2021Value: 336         Ref range: ms                 Status: FinalQTc Calculation (Bazett)                      Date: 11/24/2021Value: 463         Ref range: ms                 Status: FinalP Axis                                        Date: 11/24/2021Value: 36          Ref range: degrees            Status: FinalR Axis                                        Date: 11/24/2021Value: -40         Ref range: degrees            Status: FinalT Axis                                        Date: 11/24/2021Value: 88          Ref range: degrees            Status: FinalPro-BNP                                       Date: 11/24/2021Value: 49          Ref range: pg/mL              Status: Final              Comment: NT-pro BNP ACUTE Interpretive Guidelines: type    Current Discharge Medication ListSTOP taking these medicationsbutalbital-APAP-caffeine (FIORICET) -40 MG CAPS per capsuleComments:Reason for Stopping:propranolol (INDERAL LA) 60 MG extended release capsuleComments:Reason for Stopping:naproxen (NAPROSYN) 500 MG tabletComments:Reason for Stopping:    Time Spent on Discharge:E] minutes were spent in patient examination, evaluation, counseling as well as medication reconciliation, prescriptions for required medications, discharge plan, and follow up.     Electronically signed by Shannon Gee DO on 11/25/21 at 3:41 PM EST

## 2021-12-09 ENCOUNTER — VIRTUAL VISIT (OUTPATIENT)
Dept: FAMILY MEDICINE CLINIC | Age: 31
End: 2021-12-09
Payer: COMMERCIAL

## 2021-12-09 DIAGNOSIS — F41.9 ANXIETY AND DEPRESSION: Primary | ICD-10-CM

## 2021-12-09 DIAGNOSIS — F32.A ANXIETY AND DEPRESSION: Primary | ICD-10-CM

## 2021-12-09 PROCEDURE — 99441 PR PHYS/QHP TELEPHONE EVALUATION 5-10 MIN: CPT | Performed by: STUDENT IN AN ORGANIZED HEALTH CARE EDUCATION/TRAINING PROGRAM

## 2021-12-09 PROCEDURE — 1036F TOBACCO NON-USER: CPT | Performed by: STUDENT IN AN ORGANIZED HEALTH CARE EDUCATION/TRAINING PROGRAM

## 2021-12-09 PROCEDURE — 1111F DSCHRG MED/CURRENT MED MERGE: CPT | Performed by: STUDENT IN AN ORGANIZED HEALTH CARE EDUCATION/TRAINING PROGRAM

## 2021-12-09 PROCEDURE — G8417 CALC BMI ABV UP PARAM F/U: HCPCS | Performed by: STUDENT IN AN ORGANIZED HEALTH CARE EDUCATION/TRAINING PROGRAM

## 2021-12-09 PROCEDURE — G8427 DOCREV CUR MEDS BY ELIG CLIN: HCPCS | Performed by: STUDENT IN AN ORGANIZED HEALTH CARE EDUCATION/TRAINING PROGRAM

## 2021-12-09 PROCEDURE — G8484 FLU IMMUNIZE NO ADMIN: HCPCS | Performed by: STUDENT IN AN ORGANIZED HEALTH CARE EDUCATION/TRAINING PROGRAM

## 2021-12-09 RX ORDER — FLUTICASONE PROPIONATE 50 MCG
SPRAY, SUSPENSION (ML) NASAL
COMMUNITY
Start: 2021-07-15 | End: 2022-07-15

## 2021-12-09 RX ORDER — FERROUS SULFATE 325(65) MG
TABLET ORAL
COMMUNITY
Start: 2021-11-15

## 2021-12-09 RX ORDER — NITROFURANTOIN 25; 75 MG/1; MG/1
CAPSULE ORAL
COMMUNITY
Start: 2021-12-04

## 2021-12-09 RX ORDER — ESCITALOPRAM OXALATE 20 MG/1
20 TABLET ORAL DAILY
Qty: 30 TABLET | Refills: 0 | Status: SHIPPED | OUTPATIENT
Start: 2021-12-09 | End: 2022-01-05

## 2021-12-09 RX ORDER — FLASH GLUCOSE SENSOR
KIT MISCELLANEOUS
COMMUNITY
Start: 2021-11-26

## 2021-12-09 ASSESSMENT — ENCOUNTER SYMPTOMS
COUGH: 0
SHORTNESS OF BREATH: 0
SINUS PRESSURE: 0
ABDOMINAL PAIN: 0
VOMITING: 0
SORE THROAT: 0

## 2021-12-09 NOTE — PROGRESS NOTES
2021    TELEHEALTH EVALUATION -- Audio/Visual (During [place] [address] public health emergency)    Due to COVID 19 outbreak, patient's office visit was converted to a virtual visit. Patient was contacted and agreed to proceed with a virtual visit via [email or web address]  The risks and benefits of converting to a virtual visit were discussed in light of the current infectious disease epidemic. Patient also understood that insurance coverage and co-pays are up to their individual insurance plans. HPI:  Dianna Songkamala (:  1990) has requested an audio/video evaluation for the following concern(s):  Chief Complaint   Patient presents with    Discuss Medications     Lexapro. Depression  Previously on lexapro 20mg daily - pre pregnancy  She was switched to Zoloft during her first and second trimesters  She did not take any medications during her third trimester  After having her baby 1 month ago they restarted her Zoloft  History of postpartum depression with her first child  Does not feel like the Zoloft helps her mood is much as Lexapro  She is not breast-feeding    Review of Systems   Constitutional: Negative for chills and fever. HENT: Negative for congestion, sinus pressure and sore throat. Respiratory: Negative for cough and shortness of breath. Cardiovascular: Negative for chest pain and palpitations. Gastrointestinal: Negative for abdominal pain and vomiting. Musculoskeletal: Negative for arthralgias and myalgias. Skin: Negative for rash and wound. Neurological: Negative for speech difficulty and light-headedness. Psychiatric/Behavioral: Negative for suicidal ideas. The patient is not nervous/anxious. Prior to Visit Medications    Medication Sig Taking?  Authorizing Provider   nitrofurantoin, macrocrystal-monohydrate, (MACROBID) 100 MG capsule  Yes Historical Provider, MD   fluticasone (FLONASE) 50 MCG/ACT nasal spray 2 sprays in each nostril daily x1 week then 1-2 sprays in each nostril daily.  (use lowest possible dose after week 1) Yes Historical Provider, MD   ferrous sulfate (IRON 325) 325 (65 Fe) MG tablet  Yes Historical Provider, MD   Continuous Blood Gluc Sensor (FREESTYLE TOVA 2 SENSOR) [facility]  Yes Historical Provider, MD   escitalopram (LEXAPRO) 20 MG tablet Take 1 tablet by mouth daily Yes Uriel Jenkins,    NIFEdipine (PROCARDIA XL) 30 MG extended release tablet Take 4 tablets by mouth daily Yes Abigail Canales,    butalbital-APAP-caffeine (FIORICET) -40 MG CAPS per capsule Take 1 capsule by mouth every 4 hours as needed for Headaches  Lisa Ramey,    propranolol (INDERAL LA) 60 MG extended release capsule Take 1 capsule by mouth daily  Patient not taking: Reported on 3/25/2021  SHAMIKA Beard CNP   naproxen (NAPROSYN) 500 MG tablet Take 1 tablet by mouth 2 times daily (with meals)  Patient not taking: Reported on 3/25/2021  SHAMIKA Beard CNP       Social History     Tobacco Use    Smoking status: Never Smoker    Smokeless tobacco: Never Used   Vaping Use    Vaping Use: Never used   Substance Use Topics    Alcohol use: Not Currently    Drug use: Yes     Types: Marijuana (Weed)     Comment: Migraines        Allergies   Allergen Reactions    Lidocaine Anaphylaxis     Buvepcain (ingredient in epidural)    Bee Venom Swelling   ,   Past Medical History:   Diagnosis Date    Abnormal Pap smear of cervix     Anemia     with pregnancy    Anxiety     Complication of anesthesia     Depression     Diabetes mellitus ([facility] [address]     gestational diabetes    Headache     Osteoarthritis     Pre-eclampsia in third trimester 2021    Rh incompatibility    ,   Past Surgical History:   Procedure Laterality Date    APPENDECTOMY      ARM SURGERY Left     fx at the age 4   Jamas Roys  SECTION  2009    CHOLECYSTECTOMY     ,   Social History     Tobacco Use    Smoking status: Never Smoker    Smokeless tobacco: Never Used   Vaping Use    Vaping Use: Never used   Substance Use Topics    Alcohol use: Not Currently    Drug use: Yes     Types: Marijuana [place])     Comment: Migraines   ,   Family History   Problem Relation Age of Onset    Depression Mother     High Cholesterol Maternal Grandfather     Heart Disease Maternal Grandfather     Cancer Maternal Grandfather         lung brain    Diabetes Maternal Grandfather     Diabetes Other         mothers dide    Depression Maternal Grandmother     Kidney Disease Paternal Grandfather    ,   Immunization History   Administered Date(s) Administered    COVID-19, Matt Carrillo, PF, 30mcg/0.3mL 06/10/2021, 06/30/2021    DTP 1990, [birth year]    DTaP vaccine 06/13/1990, 10/28/1993    Hib vaccine 10/28/1993    Influenza Vaccine, unspecified formulation 08/26/2016    Influenza Virus Vaccine 08/26/2016, 10/13/2017, 09/08/2020    Influenza, Quadv, IM, PF (6 mo and older Fluzone, Flulaval, Fluarix, and 3 yrs and older Afluria) 08/31/2015, 10/13/2017, 09/11/2018, 08/23/2019    MMR 06/13/1990, 10/17/2002    Polio OPV 1989, 1989, 06/13/1990, 10/28/1993   ,   Health Maintenance   Topic Date Due    Varicella vaccine (1 of 2 - 2-dose childhood series) Never done    COVID-19 Vaccine (3 - Booster for Matt Carrillo series) 12/30/2021    Potassium monitoring  11/24/2022    Creatinine monitoring  11/24/2022    Cervical cancer screen  03/05/2024    DTaP/Tdap/Td vaccine (7 - Td or Tdap) 09/11/2031    Hib vaccine  Completed    Flu vaccine  Completed    Hepatitis C screen  Completed    HIV screen  Completed    Hepatitis A vaccine  Aged Out    Hepatitis B vaccine  Aged Out    Meningococcal (ACWY) vaccine  Aged Out    Pneumococcal 0-64 years Vaccine  Aged Out       PHYSICAL EXAMINATION:  [ INSTRUCTIONS:  \"[x]\" Indicates a positive item  \"[]\" Indicates a negative item  -- DELETE ALL ITEMS NOT EXAMINED]  [x] Alert  [] Oriented to person/place/time    [x] No apparent distress  [] Toxic appearing    [] Face flushed appearing [x] Sclera clear  [] Lips are cyanotic      [x] Breathing appears normal  [] Appears tachypneic      [] Rash on visible skin    [x] Cranial Nerves II-XII grossly intact    [x] Motor grossly intact in visible upper extremities    [] Motor grossly intact in visible lower extremities    [x] Normal Mood  [] Anxious appearing    [] Depressed appearing  [] Confused appearing      [] Poor short term memory  [] Poor long term memory    [] OTHER:      Due to this being a TeleHealth encounter, evaluation of the following organ systems is limited: Vitals/Constitutional/EENT/Resp/CV/GI//MS/Neuro/Skin/Heme-Lymph-Imm. ASSESSMENT/PLAN:  1. Anxiety and depression  Patient was previously on Lexapro for anxiety and depression, when she became pregnant she was switched to Zoloft for her first and second trimesters, she did not take anything during the third trimester  -After having her baby 1 month ago she was placed back on the Zoloft  -Does not feel like the Zoloft helps as much with her mood and would like to go back on Lexapro  -As she is postpartum and not breast-feeding we will switch her back to Lexapro at this time  -Follow-up in 1 month    2. Post partum depression      Return in about 4 weeks (around 1/6/2022). An  electronic signature was used to authenticate this note. --Crista Trejo DO on 12/9/2021 at 10:12 AM        Pursuant to the emergency declaration under the [facility] Pending [facility] to [facility] waiver authority and the [employer] Act, this Virtual  Visit was conducted, with patient's consent, to reduce the patient's risk of exposure to COVID-19 and provide continuity of care for an established patient. Services were provided through a video synchronous discussion virtually to substitute for in-person clinic visit.

## 2021-12-09 NOTE — PATIENT INSTRUCTIONS
Patient Education        Depression After Childbirth: Care Instructions  Overview     Many women get the \"baby blues\" during the first few days after childbirth. You may lose sleep, feel irritable, and cry easily. You may feel happy one minute and sad the next. Hormone changes are one cause of these emotional changes. Also, the demands of a new baby, along with visits from relatives or other family needs, can add to the stress. The \"baby blues\" often peak around the fourth day. Then they ease up in less than 2 weeks. If your moodiness or anxiety lasts for more than 2 weeks, or if you feel like life is not worth living, you may have postpartum depression. This is different for each person. Some mothers with serious depression may worry intensely about their infant's well-being. Others may feel distant from their child. Some mothers may even feel that they might harm their baby. Some may have signs of paranoia, wondering if someone is watching them. Depression is not a sign of weakness. It's a medical condition that requires treatment. Medicine and counseling often work well to reduce depression. Talk to your doctor about taking antidepressant medicine while breastfeeding. Follow-up care is a key part of your treatment and safety. Be sure to make and go to all appointments, and call your doctor if you are having problems. It's also a good idea to know your test results and keep a list of the medicines you take. How do you know if you are depressed? With all the changes in your life, you may not know if you are depressed. Pregnancy sometimes causes changes in how you feel that are similar to the symptoms of depression. Symptoms of depression include:  · Feeling sad or hopeless and losing interest in daily activities. These are the most common symptoms of depression. · Sleeping too much or not enough. · Feeling tired. You may feel as if you have no energy. · Eating too much or too little.   · Writing or talking about death, such as writing suicide notes or talking about guns, knives, or pills. Keep the numbers for these national suicide hotlines: -TALK (7-853.921.9678) and 7-710-QJZQFST (4-771.363.5417). If you or someone you know talks about suicide or feeling hopeless, get help right away. How can you care for yourself at home? · Be safe with medicines. Take your medicines exactly as prescribed. Call your doctor if you think you are having a problem with your medicine. · Eat a healthy diet so that you can keep up your energy. · Get regular daily exercise, such as walks, to help improve your mood. · Get as much sunlight as possible. Keep your shades and curtains open. Get outside as much as you can. · Avoid using alcohol or other substances to feel better. · Get as much rest and sleep as possible. Avoid doing too much. Being too tired can increase depression. · Play stimulating music throughout your day and soothing music at night. · Schedule outings and visits with friends and family. Ask them to call you regularly, so that you don't feel alone. · Ask for help with preparing food and other daily tasks. Family and friends are often happy to help with a . · Be honest with yourself and those who care about you. Tell them about your struggle. · Join a support group of new mothers. No one can better understand the challenges of caring for a  than other new mothers. · If you feel like life is not worth living or you're feeling hopeless, get help right away. Keep the numbers for these national suicide hotlines: -TALK (1-704.221.3604) and 4-557-NXKDRWN (4-557.346.1669). When should you call for help? Call 911 anytime you think you may need emergency care. For example, call if:    · You feel you cannot stop from hurting yourself, your baby, or someone else.    Call your doctor now or seek immediate medical care if:    · You are having trouble caring for yourself or your baby.     · You hear voices. Watch closely for changes in your health, and be sure to contact your doctor if:    · You have problems with your depression medicine.     · You do not get better as expected. Where can you learn more? Go to https://chpeallyeb.Sentient. org and sign in to your Takepin account. Enter D550 in the [employer] box to learn more about \"Depression After Childbirth: Care Instructions. \"     If you do not have an account, please click on the \"Sign Up Now\" link. Current as of: June 16, 2021               Content Version: 13.0  © 6260-5066 Healthwise, Incorporated. Care instructions adapted under license by [address] If you have questions about a medical condition or this instruction, always ask your healthcare professional. [address] any warranty or liability for your use of this information.

## 2021-12-13 NOTE — H&P
Labor and Delivery H&P        CHIEF COMPLAINT:  headache    HISTORY OF PRESENT ILLNESS:      The patient is a 27 y.o. female  Unknown. S/p vaginal delivery on 2021  OB History        3    Para        Term                AB        Living   1       SAB        IAB        Ectopic        Molar        Multiple        Live Births   1              Patient presents complaining of  Headache with BP in severe range in ED , postpartum. Delivered at [facility]. Estimated Due Date:  Estimated Date of Delivery: None noted. PAST MEDICAL HISTORY:   Past Medical History:   Diagnosis Date    Abnormal Pap smear of cervix     Anemia     with pregnancy    Anxiety     Complication of anesthesia     Depression     Diabetes mellitus ([address]     gestational diabetes    Headache     Osteoarthritis     Pre-eclampsia in third trimester 2021    Rh incompatibility        PAST  SURGICAL HISTORY:   Past Surgical History:   Procedure Laterality Date    APPENDECTOMY      ARM SURGERY Left     fx at the age 4   [place]  SECTION  2009    CHOLECYSTECTOMY         SOCIAL HISTORY:     reports that she has never smoked. She has never used smokeless tobacco. She reports previous alcohol use. She reports current drug use. Drug: Marijuana Berneta Danny). MEDICATIONS:    Prior to Admission medications    Medication Sig Start Date End Date Taking? Authorizing Provider   NIFEdipine (PROCARDIA XL) 30 MG extended release tablet Take 4 tablets by mouth daily 21  Yes Shana Gonzalez Spreitzer, DO   nitrofurantoin, macrocrystal-monohydrate, (MACROBID) 100 MG capsule  21   Historical Provider, MD   fluticasone (FLONASE) 50 MCG/ACT nasal spray 2 sprays in each nostril daily x1 week then 1-2 sprays in each nostril daily.  (use lowest possible dose after week 1) 7/15/21 7/15/22  Historical Provider, MD   ferrous sulfate (IRON 325) 325 (65 Fe) MG tablet  11/15/21   Historical Provider, MD   Continuous Blood Gluc Sensor (FREESTYLE [place]OVA 2 SENSOR) [facility] [place]  11/26/21   Historical Provider, MD   escitalopram (LEXAPRO) 20 MG tablet Take 1 tablet by mouth daily 12/9/21   Francisco Felton DO   butalbital-APAP-caffeine (FIORICET) -40 MG CAPS per capsule Take 1 capsule by mouth every 4 hours as needed for Headaches 2/23/21 5/19/21  Kimberly Houston DO   propranolol (INDERAL LA) 60 MG extended release capsule Take 1 capsule by mouth daily  Patient not taking: Reported on 3/25/2021 1/26/21 5/19/21  Beatriz Sizer APRN - CNP   naproxen (NAPROSYN) 500 MG tablet Take 1 tablet by mouth 2 times daily (with meals)  Patient not taking: Reported on 3/25/2021 4/23/20 5/19/21  Beatriz Sizer, APRN - CNP        FAMILY HISTORY:   Family History   Problem Relation Age of Onset    Depression Mother     High Cholesterol Maternal Grandfather     Heart Disease Maternal Grandfather     Cancer Maternal Grandfather         lung brain    Diabetes Maternal Grandfather     Diabetes Other         mothers dide    Depression Maternal Grandmother     Kidney Disease Paternal Grandfather        DRUG ALLERGIES: Lidocaine and Bee venom    PRENATAL CARE:   Complicated by: none    REVIEW OF SYSTEMS:     Pertinent items are noted in HPI. PHYSICAL EXAM:    Vital Signs: Blood pressure 135/75, pulse 116, temperature 98 °F (36.7 °C), temperature source Oral, resp. rate 16, last menstrual period 02/14/2021, SpO2 98 %, unknown if currently breastfeeding.     CONSTITUTIONAL:  awake, alert, cooperative, no apparent distress, and appears stated age  LUNGS:  No increased work of breathing, good air exchange, clear to auscultation bilaterally, no crackles or wheezing  CARDIOVASCULAR:  Normal apical impulse, regular rate and rhythm, normal S1 and S2, no S3 or S4, and no murmur noted  ABDOMEN:  No scars, normal bowel sounds, soft, non-distended, non-tender, no masses palpated, no hepatosplenomegally  Cervix:  Deferred      General Labs:      Admission on 11/22/2021 6.7* 2.4 - 5.7 mg/dL Final    Color, UA 11/22/2021 Yellow  Straw/Yellow Final    Clarity, UA 11/22/2021 Clear  Clear Final    Glucose, Ur 11/22/2021 Negative  Negative mg/dL Final    Bilirubin Urine 11/22/2021 Negative  Negative Final    Ketones, Urine 11/22/2021 Negative  Negative mg/dL Final    Specific [place], UA 11/22/2021 1.006  1.005 - 1.030 Final    Blood, Urine 11/22/2021 LARGE* Negative Final    pH, UA 11/22/2021 7.0  5.0 - 9.0 Final    Protein, UA 11/22/2021 30* Negative mg/dL Final    Urobilinogen, Urine 11/22/2021 0.2  <2.0 E.U./dL Final    Nitrite, Urine 11/22/2021 Negative  Negative Final    Leukocyte Esterase, Urine 11/22/2021 MODERATE* Negative Final    Amphetamine Screen, Urine 11/22/2021 Neg  Negative <1000 ng/mL Final    Barbiturate Screen, Ur 11/22/2021 Neg  Negative < 200 ng/mL Final    Benzodiazepine Screen, Urine 11/22/2021 Neg  Negative < 200 ng/mL Final    Cannabinoid Scrn, Ur 11/22/2021 Neg  Negative < 50 ng/mL Final    Cocaine Metabolite Screen, Urine 11/22/2021 Neg  Negative < 300 ng/mL Final    Opiate Scrn, Ur 11/22/2021 Neg  Negative < 300 ng/mL Final    PCP Screen, Urine 11/22/2021 Neg  Negative < 25 ng/mL Final    Methadone Screen, Urine 11/22/2021 Neg  Negative <300 ng/mL Final    Propoxyphene Scrn, Ur 11/22/2021 Neg  Negative <300 ng/mL Final    Oxycodone Urine 11/22/2021 Neg  Negative <100 ng/mL Final    Drug Screen Comment: 11/22/2021 see below   Final    Comment: This method is a screening test to detect only these drug  classes as part of a medical workup. Confirmatory testing  by another method should be ordered if clinically indicated.  SARS-CoV-2, NAAT 11/22/2021 Not Detected  Not Detected Final    Comment: Rapid NAAT:   Negative results should be treated as presumptive and,  if inconsistent with clinical signs and symptoms or necessary for  patient management, should be tested with an alternative molecular  assay.  Negative results do BUN 11/24/2021 5* 6 - 20 mg/dL Final    CREATININE 11/24/2021 0.68  0.50 - 0.90 mg/dL Final    GFR Non- 11/24/2021 >60.0  >60 Final    Comment: >60 mL/min/1.73m2 EGFR, calc. for ages 25 and older using the  MDRD formula (not corrected for weight), is valid for stable  renal function.  GFR  11/24/2021 >60.0  >60 Final    Comment: >60 mL/min/1.73m2 EGFR, calc. for ages 25 and older using the  MDRD formula (not corrected for weight), is valid for stable  renal function.       Calcium 11/24/2021 8.3* 8.5 - 9.9 mg/dL Final    Total Protein 11/24/2021 6.6  6.3 - 8.0 g/dL Final    Albumin 11/24/2021 3.3* 3.5 - 4.6 g/dL Final    Total Bilirubin 11/24/2021 0.4  0.2 - 0.7 mg/dL Final    Alkaline Phosphatase 11/24/2021 120  40 - 130 U/L Final    ALT 11/24/2021 14  0 - 33 U/L Final    AST 11/24/2021 13  0 - 35 U/L Final    Globulin 11/24/2021 3.3  2.3 - 3.5 g/dL Final    TSH 11/24/2021 1.890  0.440 - 3.860 uIU/mL Final    T4 Free 11/24/2021 1.27  0.84 - 1.68 ng/dL Final    Ventricular Rate 11/24/2021 114  BPM Final    Atrial Rate 11/24/2021 114  BPM Final    P-R Interval 11/24/2021 136  ms Final    QRS Duration 11/24/2021 74  ms Final    Q-T Interval 11/24/2021 336  ms Final    QTc Calculation (Bazett) 11/24/2021 463  ms Final    P Axis 11/24/2021 36  degrees Final    R Axis 11/24/2021 -40  degrees Final    T Axis 11/24/2021 88  degrees Final    Pro-BNP 11/24/2021 49  pg/mL Final    Comment: NT-pro BNP ACUTE Interpretive Guidelines:        Age        Cutoff for Heart Failure     Less than 50 yrs   450 pg/mL     50-75 yrs           900 pg/mL     Greater than 75 yrs  1800 pg/mL    NT-pro BNP NON-ACUTE Interpretive Guidelines:      Age                 Reference Range    Less than 74 yrs   0-125 pg/mL    Greater than 74 yrs   0-450 pg/mL    Other possible causes of an elevated NT-proBNP include:  cardiac ischemia, acute coronary syndrome, COPD, pneumonia,  atrial fibrillation, pulmonary emboli, pulmonary hypertension,  pericarditis    Reference:  Logan Roblero., et al. NT-proBNP testing for diagnosis and  short-term prognosis in acute destabilized HF: an international  pooled analysis of 1256 patients.  Heart Journal.  2609;60:670-661        Troponin 2021 <0.010  0.000 - 0.010 ng/mL Final    Methodology by Troponin T.    Left Ventricular Ejection Fraction 2021 65   Final-Edited    LVEF MODALITY 2021 ECHO   Final-Edited    Magnesium 2021 2.8* 1.7 - 2.4 mg/dL Final         ASSESSMENT:    The patient is a 27 y.o. female  Unknown with :     Severe postpartum pre-eclampsia. PLAN:  Disposition:  Patient sent to Labor and Delivery     For BP control and magnesium sulphate for seizure prophylaxis. Repeat labs in 6 hrs.    Protocol for BP management started     Prerna Han MD  2021

## 2022-09-13 DIAGNOSIS — F32.A ANXIETY AND DEPRESSION: ICD-10-CM

## 2022-09-13 DIAGNOSIS — F41.9 ANXIETY AND DEPRESSION: ICD-10-CM

## 2022-09-13 RX ORDER — ESCITALOPRAM OXALATE 20 MG/1
TABLET ORAL
Qty: 30 TABLET | Refills: 0 | OUTPATIENT
Start: 2022-09-13

## 2023-02-25 ENCOUNTER — APPOINTMENT (OUTPATIENT)
Dept: GENERAL RADIOLOGY | Age: 33
End: 2023-02-25
Payer: MEDICAID

## 2023-02-25 ENCOUNTER — HOSPITAL ENCOUNTER (EMERGENCY)
Age: 33
Discharge: HOME | End: 2023-02-26
Payer: MEDICAID

## 2023-02-25 DIAGNOSIS — S86.912A STRAIN OF LEFT KNEE, INITIAL ENCOUNTER: Primary | ICD-10-CM

## 2023-02-25 PROCEDURE — 99284 EMERGENCY DEPT VISIT MOD MDM: CPT

## 2023-02-25 PROCEDURE — 96372 THER/PROPH/DIAG INJ SC/IM: CPT

## 2023-02-25 PROCEDURE — 6360000002 HC RX W HCPCS: Performed by: PHYSICIAN ASSISTANT

## 2023-02-25 PROCEDURE — 73562 X-RAY EXAM OF KNEE 3: CPT

## 2023-02-25 RX ORDER — ETODOLAC 400 MG/1
400 TABLET, FILM COATED ORAL 2 TIMES DAILY
Qty: 14 TABLET | Refills: 0 | Status: SHIPPED | OUTPATIENT
Start: 2023-02-25

## 2023-02-25 RX ORDER — KETOROLAC TROMETHAMINE 30 MG/ML
60 INJECTION, SOLUTION INTRAMUSCULAR; INTRAVENOUS ONCE
Status: COMPLETED | OUTPATIENT
Start: 2023-02-25 | End: 2023-02-25

## 2023-02-25 RX ADMIN — KETOROLAC TROMETHAMINE 60 MG: 30 INJECTION, SOLUTION INTRAMUSCULAR at 23:34

## 2023-02-25 ASSESSMENT — PAIN - FUNCTIONAL ASSESSMENT: PAIN_FUNCTIONAL_ASSESSMENT: 0-10

## 2023-02-25 ASSESSMENT — PAIN DESCRIPTION - ORIENTATION: ORIENTATION: LEFT

## 2023-02-25 ASSESSMENT — ENCOUNTER SYMPTOMS
TROUBLE SWALLOWING: 0
COLOR CHANGE: 0
EYE PAIN: 0
ALLERGIC/IMMUNOLOGIC NEGATIVE: 1
SHORTNESS OF BREATH: 0
ABDOMINAL PAIN: 0
APNEA: 0

## 2023-02-25 ASSESSMENT — PAIN SCALES - GENERAL: PAINLEVEL_OUTOF10: 6

## 2023-02-25 ASSESSMENT — PAIN DESCRIPTION - LOCATION: LOCATION: KNEE

## 2023-02-25 ASSESSMENT — PAIN DESCRIPTION - DESCRIPTORS: DESCRIPTORS: SHARP;STABBING

## 2023-02-25 NOTE — Clinical Note
Arelis Arvizu was seen and treated in our emergency department on 2/25/2023. She may return to work on 02/28/2023. Seated work only for 1 week     If you have any questions or concerns, please don't hesitate to call.       Nila Fernandez PA-C
No

## 2023-02-26 VITALS
OXYGEN SATURATION: 98 % | RESPIRATION RATE: 20 BRPM | TEMPERATURE: 96.8 F | DIASTOLIC BLOOD PRESSURE: 92 MMHG | HEIGHT: 58 IN | BODY MASS INDEX: 38.83 KG/M2 | WEIGHT: 185 LBS | SYSTOLIC BLOOD PRESSURE: 166 MMHG | HEART RATE: 121 BPM

## 2023-02-26 NOTE — ED PROVIDER NOTES
[facility] ED  eMERGENCYdEPARTMENT eNCOUnter      Pt Name: Jelena Seay  MRN: [id number]  [place] 1990of evaluation: 2/25/2023  Provider:Gil Quezada PA-C    CHIEF COMPLAINT       Chief Complaint   Patient presents with    Knee Pain     X's 3 days         HISTORY OF PRESENT ILLNESS  (Location/Symptom, Timing/Onset, Context/Setting, Quality, Duration, Modifying Factors, Severity.)   Jelena Seay is a 28 y.o. female who presents to the emergency department with complaints of left greater than right bilateral knee pain. Patient states that she is on her feet at work all day and has noticed over the past several days that her knees have been hurting. Patient attributed it to her obesity and \"getting old\". Patient states that today after work she was having trouble bearing weight due to pain in the knee. Patient denies any calf pain. Patient denies any known inciting injuries. Patient denies any fevers or chills. No numbness or tingling. HPI    Nursing Notes were reviewed and I agree. REVIEW OF SYSTEMS    (2-9 systems for level 4, 10 or more for level 5)     Review of Systems   Constitutional:  Negative for diaphoresis and fever. HENT:  Negative for hearing loss and trouble swallowing. Eyes:  Negative for pain. Respiratory:  Negative for apnea and shortness of breath. Cardiovascular:  Negative for chest pain. Gastrointestinal:  Negative for abdominal pain. Endocrine: Negative. Genitourinary:  Negative for hematuria. Musculoskeletal:  Positive for gait problem and joint swelling. Negative for neck pain and neck stiffness. Skin:  Negative for color change. Allergic/Immunologic: Negative. Neurological:  Negative for dizziness and numbness. Hematological: Negative. Psychiatric/Behavioral: Negative. All other systems reviewed and are negative. as noted above the remainder of the review of systems was reviewed and negative.        PAST MEDICAL HISTORY     Past Medical History:   Diagnosis Date    Abnormal Pap smear of cervix     Anemia     with pregnancy    Anxiety     Complication of anesthesia     Depression     Diabetes mellitus ([facility] [address]     gestational diabetes    Headache     Osteoarthritis     Pre-eclampsia in third trimester 2021    Rh incompatibility          SURGICAL HISTORY       Past Surgical History:   Procedure Laterality Date    APPENDECTOMY      ARM SURGERY Left     fx at the age 4     SECTION  2009    CHOLECYSTECTOMY           CURRENT MEDICATIONS       Previous Medications    CONTINUOUS BLOOD GLUC SENSOR (FREESTYLE TOVA 2 SENSOR) MISC        ESCITALOPRAM (LEXAPRO) 20 MG TABLET    TAKE 1 TABLET BY MOUTH EVERY DAY    FERROUS SULFATE (IRON 325) 325 (65 FE) MG TABLET        FLUTICASONE (FLONASE) 50 MCG/ACT NASAL SPRAY    2 sprays in each nostril daily x1 week then 1-2 sprays in each nostril daily.  (use lowest possible dose after week 1)    NIFEDIPINE (PROCARDIA XL) 30 MG EXTENDED RELEASE TABLET    Take 4 tablets by mouth daily    NITROFURANTOIN, MACROCRYSTAL-MONOHYDRATE, (MACROBID) 100 MG CAPSULE           ALLERGIES     Lidocaine and Bee venom    HISTORY       Family History   Problem Relation Age of Onset    Depression Mother     High Cholesterol Maternal Grandfather     Heart Disease Maternal Grandfather     Cancer Maternal Grandfather         lung brain    Diabetes Maternal Grandfather     Diabetes Other         mothers dide    Depression Maternal Grandmother     Kidney Disease Paternal Grandfather           SOCIAL HISTORY       Social History     Socioeconomic History    Marital status:      Spouse name: None    Number of children: None    Years of education: None    Highest education level: None   Tobacco Use    Smoking status: Never    Smokeless tobacco: Never   Vaping Use    Vaping Use: Never used   Substance and Sexual Activity    Alcohol use: Not Currently    Drug use: Yes     Types: Marijuana Daril Sukhjinder)     Comment: Migraines    Sexual activity: Yes     Partners: Male       SCREENINGS           PHYSICAL EXAM    (up to 7 forlevel 4, 8 or more for level 5)     ED Triage Vitals [02/25/23 2234]   BP Temp Temp Source Heart Rate Resp SpO2 Height Weight   (!) 166/92 96.8 °F (36 °C) Temporal (!) 130 18 97 % 4' 10\" (1.473 m) 185 lb (83.9 kg)       Physical Exam  Vitals and nursing note reviewed. Constitutional:       General: She is not in acute distress. Appearance: She is well-developed. She is not diaphoretic. HENT:      Head: Normocephalic and atraumatic. Mouth/Throat:      Mouth: Mucous membranes are moist.      Pharynx: No oropharyngeal exudate. Eyes:      General: No scleral icterus. Conjunctiva/sclera: Conjunctivae normal.      Pupils: Pupils are equal, round, and reactive to light. Neck:      Trachea: No tracheal deviation. Cardiovascular:      Rate and Rhythm: Normal rate. Heart sounds: Normal heart sounds. Pulmonary:      Effort: Pulmonary effort is normal. No respiratory distress. Breath sounds: Normal breath sounds. Abdominal:      General: Bowel sounds are normal. There is no distension. Palpations: Abdomen is soft. Musculoskeletal:         General: Normal range of motion. Cervical back: Normal range of motion and neck supple. No rigidity or tenderness. Left knee: Swelling present. No deformity, erythema, ecchymosis or bony tenderness. Skin:     General: Skin is warm and dry. Findings: No erythema or rash. Neurological:      Mental Status: She is alert and oriented to person, place, and time. Cranial Nerves: No cranial nerve deficit. Motor: No abnormal muscle tone. Psychiatric:         Behavior: Behavior normal.         Thought Content:  Thought content normal.         Judgment: Judgment normal.         DIAGNOSTIC RESULTS     RADIOLOGY:   Non-plain film images such as CT, Ultrasound and MRI are read by the radiologist. Plain radiographic images are visualized and preliminarilyinterpreted by Laurie Rainey PA-C with the below findings:  Read By Myself:    X-ray read by myself is negative    Interpretation per the Radiologist below, if available at the time of this note:    XR KNEE LEFT (3 VIEWS)    (Results Pending)       LABS:  Labs Reviewed - No data to display    All other labs were within normal range or not returnedas of this dictation. EMERGENCYDEPARTMENT COURSE and DIFFERENTIAL DIAGNOSIS/MDM:   Vitals:    Vitals:    02/25/23 2234   BP: (!) 166/92   Pulse: (!) 130   Resp: 18   Temp: 96.8 °F (36 °C)   TempSrc: Temporal   SpO2: 97%   Weight: 185 lb (83.9 kg)   Height: 4' 10\" (1.473 m)       REASSESSMENT        Patient presents emergency department with anterior left knee pain without a definite inciting injury. Patient has no signs of Paul arthritis, erythema to suggest joint infection and x-ray that shows no bony abnormality. Patient will be placed in a knee brace and referred to orthopedic for outpatient follow-up. Medical Decision Making  Amount and/or Complexity of Data Reviewed  Radiology: ordered. Risk  Prescription drug management. Coding     PROCEDURES:    Procedures      FINAL IMPRESSION      1.  Strain of left knee, initial encounter          DISPOSITION/PLAN   DISPOSITION Decision To Discharge 02/25/2023 11:32:08 PM      PATIENT REFERRED TO:  SHAMIKA Arias - CNP  [address] [address], [address]  [facility].          [address]  [address] [facility], [address]  [address] [place] [id number]  [phone]  Call in 3 days      DISCHARGE MEDICATIONS:  New Prescriptions    ETODOLAC (LODINE) 400 MG TABLET    Take 1 tablet by mouth 2 times daily       (Please note that portions of this note were completed with a voice recognition program.  Efforts were made to edit the dictations but occasionally words are mis-transcribed.)    MARIA ANTONIA Valdes PA-C  02/25/23 [id number]

## 2023-02-26 NOTE — ED NOTES
Pt states was at work and left knee started to hurt. Pt states hurts to put pressure on left leg. Denies injury.      Lazarus Haskell  02/25/23 [id number]

## 2023-02-26 NOTE — ED NOTES
Patient D/C instructions have been reviewed, patient is to follow up with Ortho  Patient voiced understanding, no questions or concerns noted at this time.        [facility]  02/26/23 0005

## 2023-02-26 NOTE — ED NOTES
Patient Hinged Knee support applied and crutches provided to patient   Patient able ambulate appropriately with crutches  Patient report HR being high R/T her anxiety and is aware        Cam Friday, RN  02/26/23 0005

## 2023-09-20 NOTE — FLOWSHEET NOTE
Dr. Marco Weir notified of blood pressures. No new orders received. Burow's Advancement Flap Text: The defect edges were debeveled with a #15 scalpel blade.  Given the location of the defect and the proximity to free margins a Burow's advancement flap was deemed most appropriate.  Using a sterile surgical marker, the appropriate advancement flap was drawn incorporating the defect and placing the expected incisions within the relaxed skin tension lines where possible.    The area thus outlined was incised deep to adipose tissue with a #15 scalpel blade.  The skin margins were undermined to an appropriate distance in all directions utilizing iris scissors.

## 2024-07-18 ENCOUNTER — TRANSCRIBE ORDERS (OUTPATIENT)
Dept: ADMINISTRATIVE | Age: 34
End: 2024-07-18

## 2024-07-18 DIAGNOSIS — Z12.31 ENCOUNTER FOR SCREENING MAMMOGRAM FOR MALIGNANT NEOPLASM OF BREAST: Primary | ICD-10-CM

## 2024-07-26 ENCOUNTER — HOSPITAL ENCOUNTER (OUTPATIENT)
Dept: WOMENS IMAGING | Age: 34
Discharge: HOME | End: 2024-07-26
Payer: COMMERCIAL

## 2024-07-26 VITALS — BODY MASS INDEX: 43.05 KG/M2 | WEIGHT: 206 LBS

## 2024-07-26 DIAGNOSIS — Z12.31 ENCOUNTER FOR SCREENING MAMMOGRAM FOR MALIGNANT NEOPLASM OF BREAST: ICD-10-CM

## 2024-07-26 PROCEDURE — 77063 BREAST TOMOSYNTHESIS BI: CPT
